# Patient Record
Sex: FEMALE | Race: WHITE | NOT HISPANIC OR LATINO | Employment: OTHER | URBAN - METROPOLITAN AREA
[De-identification: names, ages, dates, MRNs, and addresses within clinical notes are randomized per-mention and may not be internally consistent; named-entity substitution may affect disease eponyms.]

---

## 2018-02-17 ENCOUNTER — APPOINTMENT (EMERGENCY)
Dept: RADIOLOGY | Facility: HOSPITAL | Age: 73
End: 2018-02-17
Payer: COMMERCIAL

## 2018-02-17 ENCOUNTER — HOSPITAL ENCOUNTER (EMERGENCY)
Facility: HOSPITAL | Age: 73
Discharge: HOME | End: 2018-02-17
Attending: EMERGENCY MEDICINE
Payer: COMMERCIAL

## 2018-02-17 VITALS
BODY MASS INDEX: 23.99 KG/M2 | RESPIRATION RATE: 18 BRPM | OXYGEN SATURATION: 98 % | SYSTOLIC BLOOD PRESSURE: 153 MMHG | HEIGHT: 65 IN | DIASTOLIC BLOOD PRESSURE: 63 MMHG | HEART RATE: 86 BPM | TEMPERATURE: 97.9 F | WEIGHT: 144 LBS

## 2018-02-17 DIAGNOSIS — S42.309A HUMERUS FRACTURE: Primary | ICD-10-CM

## 2018-02-17 PROCEDURE — 99283 EMERGENCY DEPT VISIT LOW MDM: CPT

## 2018-02-17 PROCEDURE — 73030 X-RAY EXAM OF SHOULDER: CPT

## 2018-02-17 RX ORDER — IBUPROFEN 400 MG/1
TABLET ORAL EVERY 6 HOURS PRN
COMMUNITY
End: 2018-05-05 | Stop reason: ALTCHOICE

## 2018-02-17 RX ORDER — HYDROCODONE BITARTRATE AND ACETAMINOPHEN 5; 325 MG/1; MG/1
1 TABLET ORAL EVERY 6 HOURS PRN
Qty: 10 TABLET | Refills: 0 | Status: SHIPPED | OUTPATIENT
Start: 2018-02-17 | End: 2018-02-27

## 2018-02-17 RX ORDER — HYDROCODONE BITARTRATE AND ACETAMINOPHEN 5; 325 MG/1; MG/1
1 TABLET ORAL ONCE
Status: COMPLETED | OUTPATIENT
Start: 2018-02-17 | End: 2018-02-17

## 2018-02-17 RX ADMIN — HYDROCODONE BITARTRATE AND ACETAMINOPHEN 1 TABLET: 5; 325 TABLET ORAL at 17:56

## 2018-02-17 NOTE — ED PROVIDER NOTES
History  Chief Complaint   Patient presents with    Shoulder Injury     Patient presents to the ED for evaluation and treatment of an injury to her right shoulder that occured approximately 1 5 hours PTA when she was walking her dog  History provided by:  Patient   used: No      Patient is a 71-year-old female presenting to emergency department right shoulder pain  Was walking a new doctor dog when the dog took off and pulled the shoulder  Since then has been having pain in the shoulder  No head trauma  No neck pain  No chest pain, abdominal pain, shortness of breath  No back pain  No leg pain  No weakness numbness or tingling  MDM shoulder x-ray shows fracture of the neck  Patient does not want any narcotics  Ibuprofen and sling  Follow up with Orthopedics  Changed her mind  Will give Vicodin  Prior to Admission Medications   Prescriptions Last Dose Informant Patient Reported? Taking? ALPRAZolam (XANAX) 0 5 mg tablet   Yes No   Sig: Take 0 5 mg by mouth daily at bedtime as needed for anxiety  ibuprofen (MOTRIN) 400 mg tablet   Yes Yes   Sig: Take by mouth every 6 (six) hours as needed for mild pain      Facility-Administered Medications: None       Past Medical History:   Diagnosis Date    Anxiety     Non-Hodgkin lymphoma ([address] )        Past Surgical History:   Procedure Laterality Date    BREAST SURGERY      biopsy    HYSTERECTOMY      TONSILLECTOMY         Family History   Problem Relation Age of Onset    Ovarian cancer Mother     Heart attack Father      I have reviewed and agree with the history as documented  Social History   Substance Use Topics    Smoking status: Never Smoker    Smokeless tobacco: Never Used    Alcohol use Yes      Comment: socially        Review of Systems   Constitutional: Negative for chills, diaphoresis and fever  HENT: Negative for congestion and sore throat      Respiratory: Negative for cough, shortness of breath, wheezing and stridor  Cardiovascular: Negative for chest pain, palpitations and leg swelling  Gastrointestinal: Negative for abdominal pain, blood in stool, diarrhea, nausea and vomiting  Genitourinary: Negative for dysuria, frequency and urgency  Musculoskeletal: Negative for neck pain and neck stiffness  Skin: Negative for pallor and rash  Neurological: Negative for dizziness, syncope, weakness, light-headedness and headaches  All other systems reviewed and are negative  Physical Exam  ED Triage Vitals   Temperature Pulse Respirations Blood Pressure SpO2   02/17/18 1722 02/17/18 1720 02/17/18 1720 02/17/18 1720 02/17/18 1720   97 9 °F (36 6 °C) 86 18 153/63 98 %      Temp Source Heart Rate Source Patient Position - Orthostatic VS BP Location FiO2 (%)   02/17/18 1720 02/17/18 1720 02/17/18 1720 02/17/18 1720 --   Oral Monitor Sitting Right arm       Pain Score       02/17/18 1720       3           Orthostatic Vital Signs  Vitals:    02/17/18 1720   BP: 153/63   Pulse: 86   Patient Position - Orthostatic VS: Sitting       Physical Exam   Constitutional: She is oriented to person, place, and time  She appears well-developed and well-nourished  HENT:   Head: Normocephalic and atraumatic  Eyes: EOM are normal  Pupils are equal, round, and reactive to light  Neck: Normal range of motion  Neck supple  Cardiovascular: Normal rate, regular rhythm, normal heart sounds and intact distal pulses  Pulmonary/Chest: Effort normal and breath sounds normal  No respiratory distress  Abdominal: Soft  Bowel sounds are normal  There is no tenderness  Musculoskeletal: She exhibits no edema  Decreased range of motion of the right shoulder, this is due to pain, neurovascularly intact distally  All other joints intact  Neurological: She is alert and oriented to person, place, and time  Skin: Skin is warm and dry  Capillary refill takes less than 2 seconds  No rash noted  No erythema     Vitals reviewed  ED Medications  Medications   HYDROcodone-acetaminophen (NORCO) 5-325 mg per tablet 1 tablet (1 tablet Oral Given 2/17/18 1756)       Diagnostic Studies  Results Reviewed     None                 XR shoulder 2+ views RIGHT   ED Interpretation by Lai Reich MD (02/17 1719)   Humerus neck fracture                 Procedures  Procedures       Phone Contacts  ED Phone Contact    ED Course  ED Course                                MDM  CritCare Time    Disposition  Final diagnoses:   Humerus fracture     Time reflects when diagnosis was documented in both MDM as applicable and the Disposition within this note     Time User Action Codes Description Comment    2/17/2018  5:22 PM Manuela Garcia Add [S42 309A] Humerus fracture       ED Disposition     ED Disposition Condition Comment    Discharge  [address] [facility] discharge to home/self care  Condition at discharge: Good        Follow-up Information     Follow up With Specialties Details [facility] [address] Emergency Department Emergency Medicine  As needed, If symptoms worsen, headache, chest pain, shortness of breath, weakness, numbness, tingling or feeling worse overall [address] [address]  [phone] [facility] ED, [address], [place], [place], [address] [facility] In 5 days Humerus fracture [address] [facility] [address]  [phone]         Discharge Medication List as of 2/17/2018  5:25 PM      CONTINUE these medications which have NOT CHANGED    Details   ibuprofen (MOTRIN) 400 mg tablet Take by mouth every 6 (six) hours as needed for mild pain, Historical Med      ALPRAZolam (XANAX) 0 5 mg tablet Take 0 5 mg by mouth daily at bedtime as needed for anxiety  , Until Discontinued, Historical Med           No discharge procedures on file      ED Provider  Electronically Signed by           Boogie Akhtar MD  02/17/18 1358

## 2018-02-17 NOTE — DISCHARGE INSTRUCTIONS
Please use ibuprofen and Tylenol for pain  You  Arm Fracture in Adults   WHAT YOU NEED TO KNOW:   An arm fracture is a crack or break in one or more of the bones in your arm  An arm fracture may be caused by a fall onto an outstretched hand  It may also be caused by trauma from a car accident or a sports injury  Osteoporosis (brittle bones) can increase your risk for a fracture  DISCHARGE INSTRUCTIONS:   Seek care immediately if:   · The pain in your injured arm does not get better or gets worse, even after you rest and take medicine  · Your injured arm, hand, or fingers feel numb  · Your arm is swollen, red, and feels warm  · Your skin over the arm fracture is swollen, cold, or pale  · You cannot move your arm, hand, or fingers  Contact your healthcare provider if:   · You have a fever  · Your brace or splint becomes wet, damaged, comes off  · You have questions or concerns about your injury, treatment, or care  Medicines:   · NSAIDs , such as ibuprofen, help decrease swelling and pain  This medicine is available with or without a doctor's order  NSAIDs can cause stomach bleeding or kidney problems in certain people  If you take blood thinner medicine, always ask your healthcare provider if NSAIDs are safe for you  Always read the medicine label and follow directions  · Acetaminophen  decreases pain  It is available without a doctor's order  Ask how much to take and how often to take it  Follow directions  Acetaminophen can cause liver damage if not taken correctly  · Prescription pain medicine  may be given  Ask how to take this medicine safely  · Take your medicine as directed  Contact your healthcare provider if you think your medicine is not helping or if you have side effects  Tell him or her if you are allergic to any medicine  Keep a list of the medicines, vitamins, and herbs you take  Include the amounts, and when and why you take them   Bring the list or the pill bottles to follow-up visits  Carry your medicine list with you in case of an emergency  Follow up with your healthcare provider within 1 week: You may need to see a bone specialist within 3 to 4 days if you need surgery or further treatment for your arm fracture  Write down your questions so you remember to ask them during your visits  Rest:  You should rest your arm as much as possible  Ask your healthcare provider when you can put pressure or weight on your arm  Also ask when you can return to sports or vigorous exercises  Ice:  Apply ice on your arm for 15 to 20 minutes every hour or as directed  Use an ice pack, or put crushed ice in a plastic bag  Cover it with a towel  Ice helps prevent tissue damage and decreases swelling and pain  Elevate:  Elevate your arm above the level of your heart as often as you can  This will help decrease swelling and pain  Prop your arm on pillows or blankets to keep it elevated comfortably  Care for your cast or splint:  Ask your healthcare provider when it is okay to bathe  Do not get your cast or splint wet  Before you take a bath or shower, cover your cast or splint with a plastic bag  Tape the bag to your skin to help keep water out  Hold your arm away from the water in case the bag leaks  · Check the skin around your cast or splint each day for any redness or open skin  · Do not use a sharp or pointed object to scratch your skin under the cast or splint  Physical therapy:  A physical therapist teaches you exercises to help improve movement and strength, and to decrease pain  © 2017 [address] Information is for End User's use only and may not be sold, redistributed or otherwise used for commercial purposes  All illustrations and images included in CareNotes® are the copyrighted property of [employer]  or Torin Chakraborty  The above information is an  only   It is not intended as medical advice for individual conditions or treatments  Talk to your doctor, nurse or pharmacist before following any medical regimen to see if it is safe and effective for you  can take them every 6 hours

## 2018-02-20 ENCOUNTER — OFFICE VISIT (OUTPATIENT)
Dept: OBGYN CLINIC | Facility: CLINIC | Age: 73
End: 2018-02-20
Payer: COMMERCIAL

## 2018-02-20 VITALS
WEIGHT: 148.6 LBS | SYSTOLIC BLOOD PRESSURE: 160 MMHG | BODY MASS INDEX: 24.76 KG/M2 | HEIGHT: 65 IN | HEART RATE: 87 BPM | DIASTOLIC BLOOD PRESSURE: 78 MMHG

## 2018-02-20 DIAGNOSIS — S42.251A DISPLACED FRACTURE OF GREATER TUBEROSITY OF RIGHT HUMERUS, INITIAL ENCOUNTER FOR CLOSED FRACTURE: ICD-10-CM

## 2018-02-20 PROCEDURE — 99204 OFFICE O/P NEW MOD 45 MIN: CPT | Performed by: ORTHOPAEDIC SURGERY

## 2018-02-20 PROCEDURE — 23620 CLTX GR HMRL TBRS FX WO MNPJ: CPT | Performed by: ORTHOPAEDIC SURGERY

## 2018-02-20 NOTE — LETTER
To whom it May concern:    Topher Almanza is medically not cleared for flying due to her right shoulder fracture  Please cancel or reschedule her flight for next week as she will not be able to fly for quite some time  If you have any further questions, please do not hesitate to ask      Sincerely,    Tripp Zee MD

## 2018-02-20 NOTE — PROGRESS NOTES
H&P Exam - Orthopedics   Wilbur Lorenz 71 y o  female MRN: [id number]  Unit/Bed#:  Encounter: [id number]    Assessment/Plan     Assessment:  Right proximal humerus fracture, closed, initial encounter  Plan:  Trina Calle has an acceptably aligned right proximal humerus fracture  She will do well in her sling and I do not feel any other intervention is necessary at this time  I encouraged her to continue to ice for swelling and pain  I explained that the bruising is a result of her fracture and the bruising may worsen  She will follow up with me in 4 weeks to have repeat x-rays  Scribe Attestation    I,:   Anson Saldaña MA am acting as a scribe while in the presence of the attending physician :        I,:   Cheng Balderrama MD personally performed the services described in this documentation    as scribed in my presence :            History of Present Illness   HPI:  Dorota Niece is a 71 y o  female who presents with a right proximal humerus fracture suffered on February 17, 2018 while walking her dog  She states her dog was startled and took off on a run forcibly yanking right shoulder causing her to fall  She was evaluated in the emergency room for right shoulder pain and obtained x-rays this covering the fracture  She remains in her sling that was provided to her by the emergency department though she admits to removing this at night to sleep  [place] Her chief complaint is of the persistent moderate ache about the right shoulder that is nonradiating  Range of motion will increase her pain and she is better at rest in her sling  She is also been using ibuprofen for pain control as well as icing  Review of Systems   Constitutional: Negative  HENT: Negative  Eyes: Negative  Respiratory: Negative  Cardiovascular: Negative  Gastrointestinal: Negative  Endocrine: Negative  Genitourinary: Negative  Musculoskeletal:        As noted in HPI   Allergic/Immunologic: Negative  Neurological: Negative  Hematological: Negative  Psychiatric/Behavioral: The patient is nervous/anxious  Historical Information   Past Medical History:   Diagnosis Date    Anxiety     Non-Hodgkin lymphoma ([address] )      Past Surgical History:   Procedure Laterality Date    BREAST SURGERY      biopsy    HYSTERECTOMY      TONSILLECTOMY       Social History   History   Alcohol Use    Yes     Comment: socially     History   Drug Use No     History   Smoking Status    Never Smoker   Smokeless Tobacco    Never Used     Family History:   Family History   Problem Relation Age of Onset    Ovarian cancer Mother     Heart attack Father        Meds/Allergies   all medications and allergies reviewed  No Known Allergies    Objective   Vitals: Blood pressure 160/78, pulse 87, height 5' 5" (1 651 m), weight 67 4 kg (148 lb 9 6 oz)  ,Body mass index is 24 73 kg/m²  [unfilled]    [unfilled]    Invasive Devices          No matching active lines, drains, or airways          Physical Exam   Constitutional: She is oriented to person, place, and time  She appears well-developed and well-nourished  HENT:   Head: Normocephalic and atraumatic  Eyes: Conjunctivae and EOM are normal    Neck: Normal range of motion  Cardiovascular: Normal rate  Pulmonary/Chest: Effort normal and breath sounds normal    Musculoskeletal:   As noted in HPI   Neurological: She is alert and oriented to person, place, and time  Psychiatric: She has a normal mood and affect  Her behavior is normal  Judgment and thought content normal      Right Shoulder Exam     Tenderness   Right shoulder tenderness location: Proximal humerus  Other   Sensation: normal  Pulse: present    Comments:  Antalgia to ROM  Range of motion and strength deferred  Normal sensations distally and 2+ radial pulse  Left Shoulder Exam     Muscle Strength   Left shoulder normal muscle strength: Deferred              Lab Results:  None  Imaging: X-rays shoulder taken on February 17, 2018 demonstrate an acute proximal humerus fracture and evidence of calcific tendinitis  EKG, Pathology, and Other Studies: None    Code Status: [unfilled]  Advance Directive and Living Will:      Power of :    POLST:

## 2018-02-20 NOTE — PATIENT INSTRUCTIONS
Proximal Humerus Fracture   WHAT YOU NEED TO KNOW:   A proximal humerus fracture is a crack or break in the top of your upper arm bone  The proximal humerus is one of the bones in your shoulder joint  This type of fracture may be caused by a fall, trauma from a car accident, or a sports injury  DISCHARGE INSTRUCTIONS:   Return to the emergency department if:   · Your pain does not get better or gets worse, even after you rest and take medicine  · Your arm, hand, or fingers feel numb  · The skin over your fracture is swollen, cold, or pale  · You cannot move your arm, hand, or fingers  Contact your healthcare provider if:   · You have a fever  · Your sling gets wet, damaged, or falls off  · You have questions or concerns about your condition or care  Medicines:   · Prescription pain medicine  may be given  Ask your healthcare provider how to take this medicine safely  Some prescription pain medicines contain acetaminophen  Do not take other medicines that contain acetaminophen without talking to your healthcare provider  Too much acetaminophen may cause liver damage  Prescription pain medicine may cause constipation  Ask your healthcare provider how to prevent or treat constipation  · NSAIDs , such as ibuprofen, help decrease swelling, pain, and fever  This medicine is available with or without a doctor's order  NSAIDs can cause stomach bleeding or kidney problems in certain people  If you take blood thinner medicine, always ask your healthcare provider if NSAIDs are safe for you  Always read the medicine label and follow directions  · Acetaminophen  decreases pain  It is available without a doctor's order  Ask how much to take and how often to take it  Follow directions  Acetaminophen can cause liver damage if not taken correctly  · Take your medicine as directed  Contact your healthcare provider if you think your medicine is not helping or if you have side effects   Tell him of her if you are allergic to any medicine  Keep a list of the medicines, vitamins, and herbs you take  Include the amounts, and when and why you take them  Bring the list or the pill bottles to follow-up visits  Carry your medicine list with you in case of an emergency  A sling  may be needed to hold your broken bones in place  It will decrease your arm movement and allow the bones to heal    Manage your symptoms:   · Rest  your arm as much as possible  Ask your healthcare provider when you can move your arm  Also ask when you can return to sports or vigorous exercises  · Apply ice  on your arm for 15 to 20 minutes every hour or as directed  Use an ice pack, or put crushed ice in a plastic bag  Cover it with a towel  Ice helps prevent tissue damage and decreases swelling and pain  · Go to physical therapy as directed  A physical therapist teaches you exercises to help improve movement and strength, and to decrease pain  Follow up with your healthcare provider as directed:  Write down your questions so you remember to ask them during your visits  © 2017 [address] [facility] Information is for End User's use only and may not be sold, redistributed or otherwise used for commercial purposes  All illustrations and images included in CareNotes® are the copyrighted property of A D A M [employer] Inc  or Torin Chakraborty  The above information is an  only  It is not intended as medical advice for individual conditions or treatments  Talk to your doctor, nurse or pharmacist before following any medical regimen to see if it is safe and effective for you

## 2018-03-23 ENCOUNTER — OFFICE VISIT (OUTPATIENT)
Dept: OBGYN CLINIC | Facility: CLINIC | Age: 73
End: 2018-03-23

## 2018-03-23 ENCOUNTER — APPOINTMENT (OUTPATIENT)
Dept: RADIOLOGY | Facility: CLINIC | Age: 73
End: 2018-03-23
Payer: COMMERCIAL

## 2018-03-23 DIAGNOSIS — S42.251A DISPLACED FRACTURE OF GREATER TUBEROSITY OF RIGHT HUMERUS, INITIAL ENCOUNTER FOR CLOSED FRACTURE: ICD-10-CM

## 2018-03-23 DIAGNOSIS — S42.251A DISPLACED FRACTURE OF GREATER TUBEROSITY OF RIGHT HUMERUS, INITIAL ENCOUNTER FOR CLOSED FRACTURE: Primary | ICD-10-CM

## 2018-03-23 PROCEDURE — 99024 POSTOP FOLLOW-UP VISIT: CPT | Performed by: ORTHOPAEDIC SURGERY

## 2018-03-23 PROCEDURE — 73030 X-RAY EXAM OF SHOULDER: CPT

## 2018-03-23 NOTE — PROGRESS NOTES
Subjective: The real a is a 61-year-old female returned to see us status post 5 weeks of a right greater tuberosity fracture  She states she is doing well although she does have some concern over the continued pain that she is having in the lateral aspect of the right shoulder and will radiate distally  She is concerned that she may have been too active over the past 4 weeks  Her intermittent pain increases with range of motion and she is better at rest and in her comfort sling  Scribe Attestation    I,:   Anson Saldaña MA am acting as a scribe while in the presence of the attending physician :        I,:   Rajiv Oliva MD personally performed the services described in this documentation    as scribed in my presence :            Objective: Inspection of the right shoulder from the skin to be of normal color and temperature  There is mild tenderness over the lateral aspect of the shoulder near the humeral neck  She has good sensations distally normal sensation to light touch and 2+ radial pulse  Range of motion is limited secondary to pain and as expected  Imaging:  X-rays of the right shoulder demonstrate a healing displaced greater tuberosity fracture with acceptable alignment of the right shoulder  Assessment:  Closed healing and displaced greater tuberosity fracture, subsequent encounter    Plan:  Linseyjamil Carpenter is doing well  Her x-rays today demonstrate acceptable alignment in the fracture is healing  She can now discontinue to use the sling  I have also provided her a prescription for physical therapy  I informed her that she can now begin to use her arm for more daily activities gradually  She will follow up with me in 6 weeks for new x-rays and repeat evaluation

## 2018-04-09 ENCOUNTER — EVALUATION (OUTPATIENT)
Dept: PHYSICAL THERAPY | Facility: CLINIC | Age: 73
End: 2018-04-09
Payer: COMMERCIAL

## 2018-04-09 DIAGNOSIS — S42.251D CLOSED DISPLACED FRACTURE OF GREATER TUBEROSITY OF RIGHT HUMERUS WITH ROUTINE HEALING, SUBSEQUENT ENCOUNTER: Primary | ICD-10-CM

## 2018-04-09 DIAGNOSIS — S42.251A DISPLACED FRACTURE OF GREATER TUBEROSITY OF RIGHT HUMERUS, INITIAL ENCOUNTER FOR CLOSED FRACTURE: ICD-10-CM

## 2018-04-09 PROCEDURE — 97110 THERAPEUTIC EXERCISES: CPT | Performed by: PHYSICAL THERAPIST

## 2018-04-09 PROCEDURE — 97112 NEUROMUSCULAR REEDUCATION: CPT | Performed by: PHYSICAL THERAPIST

## 2018-04-09 PROCEDURE — G8985 CARRY GOAL STATUS: HCPCS | Performed by: PHYSICAL THERAPIST

## 2018-04-09 PROCEDURE — 97161 PT EVAL LOW COMPLEX 20 MIN: CPT | Performed by: PHYSICAL THERAPIST

## 2018-04-09 PROCEDURE — G8984 CARRY CURRENT STATUS: HCPCS | Performed by: PHYSICAL THERAPIST

## 2018-04-09 NOTE — PROGRESS NOTES
PT Evaluation     Today's date: 2018  Patient name: Alfredo Robles  : 1945  MRN: [id number]  Referring provider: Rin Sage MD  Dx:   Encounter Diagnosis     ICD-10-CM    1  Closed displaced fracture of greater tuberosity of right humerus, initial encounter S42 942A                   Assessment  Impairments: abnormal or restricted ROM, impaired physical strength and pain with function  Functional limitations: Difficulty with right UE above shoulder height  Assessment details: Patient is a 71 y o  Female s/p fall and subsequent fracture of right greater tuberosity  She presents to the clinic with reports of mild pain, weakness and significant limitations in right shoulder ROM which effects functional use of her Right UE overhead  She will benefit from skilled PT services in order to decrease her pain, and increase her shoulder strength and ROM to facilitate return to participation in yoga and her previous level of function  Understanding of Dx/Px/POC: good  Goals  STG (3 weeks):  1  Patient will demonstrate right shoulder flexion and abd to 130 degrees to facility increased use of right UE overhead  2  Patient will deny pain with overhead use of right UE  LTG (6 weeks):  1  Patient will demonstrate right shoulder ROM to [facility] in order to dress normally  2  Patient will demonstrate right shoulder strength of 4+/5 in order to lift stack of plates overhead  3   Patient will report right shoulder pain as a 0-1/10 at worst in order to resume previous level of activity and yoga    Plan  Patient would benefit from: skilled PT  Planned modality interventions: cryotherapy  Planned therapy interventions: manual therapy, joint mobilization, patient education, neuromuscular re-education, strengthening, stretching, therapeutic activities, therapeutic exercise and home exercise program  Frequency: 2x week  Duration in weeks: 6  Treatment plan discussed with: patient        Subjective Evaluation    History of Present Illness  Date of onset: 2018  Mechanism of injury: Patient was walking her dog and it pulled her off of her foot  Went to the ED at [address] [place] [address] and had an x-ray which was positive for a greater tuberosity fracture of the right Ue  Saw orthopedist a few days later  She was in a sling for 5 weeks  Had xrays recently and was cleared to start physical therapy  Not a recurrent problem   Quality of life: excellent    Pain  Current pain ratin  At best pain ratin  At worst pain rating: 3  Location: right proximal lateral brashium  Quality: tight  Relieving factors: rest  Progression: improved    Social Support  Lives with: spouse    Employment status: not working (Retired)  Hand dominance: right  Exercise history: Yoga 2x/wk  Has not resumed  Diagnostic Tests  X-ray: abnormal (Right GH fx of UE)    FCE comments: Has resumed all normal activities  Difficulty styling hair  Was able to put bra on behind her back today  Treatments  Previous treatment: medication  Current treatment: physical therapy  Patient Goals  Patient goals for therapy: increased motion and increased strength          Objective     Active Range of Motion   Left Shoulder   Flexion: 153 degrees   Abduction: 150 degrees   External rotation 45°: with pain  Internal rotation 45°: with pain    Right Shoulder   Flexion: 110 degrees with pain  Abduction: 98 degrees with pain  External rotation 45°: 53 degrees with pain  Internal rotation 45°: 55 degrees with pain    Passive Range of Motion     Right Shoulder   Flexion: 120 degrees with pain  Abduction: 102 degrees with pain  External rotation 45°: 53 degrees with pain  Internal rotation 45°: 55 degrees with pain    Strength/Myotome Testing     Right Shoulder     Planes of Motion   Flexion: 4   Abduction: 4   External rotation at 0°: 4   Internal rotation at 0°: 4+           Precautions: Non-Hodgkins Lymphoma, Anxiety    Daily Treatment Diary     Manual              PROM right shoulder [address] [facility] mobs                                                        Exercise Diary  4/9            UBE             Pulleys Flexion and abduction x20            Finger ladder flexion and abd             Cane AAROM x20            Towel IR stretch             Webslide Rows H,M,L             Shoulder 3 ways             T-band shoulder IR/ER             Bent over rows             Supine shoulder flexion             SL shoulder abd             SL shoulder ER             Seated stool lat stretch                                                                                                            Modalities              CP prn right shld

## 2018-04-12 ENCOUNTER — OFFICE VISIT (OUTPATIENT)
Dept: PHYSICAL THERAPY | Facility: CLINIC | Age: 73
End: 2018-04-12
Payer: COMMERCIAL

## 2018-04-12 DIAGNOSIS — S42.251D CLOSED DISPLACED FRACTURE OF GREATER TUBEROSITY OF RIGHT HUMERUS WITH ROUTINE HEALING, SUBSEQUENT ENCOUNTER: Primary | ICD-10-CM

## 2018-04-12 PROCEDURE — 97140 MANUAL THERAPY 1/> REGIONS: CPT

## 2018-04-12 PROCEDURE — 97112 NEUROMUSCULAR REEDUCATION: CPT

## 2018-04-12 PROCEDURE — 97110 THERAPEUTIC EXERCISES: CPT

## 2018-04-12 NOTE — PROGRESS NOTES
Daily Note     Today's date: 2018  Patient name: Marietta Bell  : 1945  MRN: [id number]  Referring provider: Caitlin Albert MD  Dx:   Encounter Diagnosis     ICD-10-CM    1  Closed displaced fracture of greater tuberosity of right humerus with routine healing, subsequent encounter S42 251D        Start Time: 1803          Subjective: Pt denies any pain upon arrival today  She states that she has no pain she just cannot move her RUE the way she wants to      Objective: See treatment diary below  Precautions: Non-Hodgkins Lymphoma, Anxiety     Daily Treatment Diary      Manual                        PROM right shoulder  SA                     GH jt mobs                                                                                                     Exercise Diary                     UBE    5'F/5'B standing                   Pulleys Flexion and abduction x20  20x                   Finger ladder flexion and abd    20x                   Cane AAROM x20  20x                   Towel IR stretch                       Webslide Rows H,M,L                       Shoulder 3 ways    AROM 20X                   T-band shoulder IR/ER                       Bent over rows    AROM 20x                   Supine shoulder flexion    20x                   SL shoulder abd    20x                   SL shoulder ER    20x                   Seated stool lat stretch                                                                                                                                                                                                     Modalities                        CP prn right shld                                                                              Assessment: Pt had no pain post treatment today  She noted feeling great post PROM  She was educated on HEP including scapular retraction to decrease pain  Pt will benefit from continued skilled PT to improve ROM     Plan: Progress treatment as tolerated

## 2018-04-16 ENCOUNTER — OFFICE VISIT (OUTPATIENT)
Dept: PHYSICAL THERAPY | Facility: CLINIC | Age: 73
End: 2018-04-16
Payer: COMMERCIAL

## 2018-04-16 DIAGNOSIS — S42.251D CLOSED DISPLACED FRACTURE OF GREATER TUBEROSITY OF RIGHT HUMERUS WITH ROUTINE HEALING, SUBSEQUENT ENCOUNTER: Primary | ICD-10-CM

## 2018-04-16 PROCEDURE — 97110 THERAPEUTIC EXERCISES: CPT

## 2018-04-16 PROCEDURE — G8984 CARRY CURRENT STATUS: HCPCS

## 2018-04-16 PROCEDURE — G8985 CARRY GOAL STATUS: HCPCS

## 2018-04-16 PROCEDURE — 97112 NEUROMUSCULAR REEDUCATION: CPT

## 2018-04-16 PROCEDURE — 97530 THERAPEUTIC ACTIVITIES: CPT

## 2018-04-16 PROCEDURE — 97140 MANUAL THERAPY 1/> REGIONS: CPT

## 2018-04-16 NOTE — PROGRESS NOTES
Daily Note     Today's date: 2018  Patient name: Marietta Bell  : 1945  MRN: [id number]  Referring provider: Caitlin Albert MD  Dx:   Encounter Diagnosis     ICD-10-CM    1  Closed displaced fracture of greater tuberosity of right humerus with routine healing, subsequent encounter S42 251D        Start Time: 1608          Subjective: Pt denies any pain upon arrival today  She states that she has an ache in her shoulder but it is because of the weather  Objective: See treatment diary below  Precautions: Non-Hodgkins Lymphoma, Anxiety     Daily Treatment Diary      Manual                      PROM right shoulder  SA  SA                   GH jt mobs                                                                                                     Exercise Diary                   UBE    5'F/5'B standing  5'F/5'B standing                 Pulleys Flexion and abduction x20  20x  20x                 Finger ladder flexion and abd    20x  20x                 Cane AAROM x20  20x  20x                 Towel IR stretch                       Webslide Rows H,M,L      RTB 20x                 Shoulder 3 ways    AROM 20X  AROM 20x                 T-band shoulder IR/ER                       Bent over rows    AROM 20x  AROM 20x                 Supine shoulder flexion    20x  20x                 SL shoulder abd    20x  20x                 SL shoulder ER    20x  20x                 Seated stool lat stretch                                                                                                                                                                                                     Modalities                        CP prn right shld                                                                                 Assessment: Pt had no pain post treatment  She noted always feeling much better after performing Hanny  PROM is improving with verbal cues to relax   She was educated on HEP including correcting postural alignment to improve ROM  Pt will benefit from continued skilled PT to improve postural alignment and shoulder ROM  Plan: Progress treatment as tolerated

## 2018-04-19 ENCOUNTER — OFFICE VISIT (OUTPATIENT)
Dept: PHYSICAL THERAPY | Facility: CLINIC | Age: 73
End: 2018-04-19
Payer: COMMERCIAL

## 2018-04-19 DIAGNOSIS — S42.251D CLOSED DISPLACED FRACTURE OF GREATER TUBEROSITY OF RIGHT HUMERUS WITH ROUTINE HEALING, SUBSEQUENT ENCOUNTER: Primary | ICD-10-CM

## 2018-04-19 PROCEDURE — 97530 THERAPEUTIC ACTIVITIES: CPT

## 2018-04-19 PROCEDURE — 97140 MANUAL THERAPY 1/> REGIONS: CPT

## 2018-04-19 PROCEDURE — 97112 NEUROMUSCULAR REEDUCATION: CPT

## 2018-04-19 PROCEDURE — 97110 THERAPEUTIC EXERCISES: CPT

## 2018-04-19 NOTE — PROGRESS NOTES
Daily Note     Today's date: 2018  Patient name: Radha Jain  : 1945  MRN: [id number]  Referring provider: Malik Walsh MD  Dx:   Encounter Diagnosis     ICD-10-CM    1  Closed displaced fracture of greater tuberosity of right humerus with routine healing, subsequent encounter S42 251D        Start Time: 1735          Subjective: Pt c/o 4/10 pain in R shoulder today  She states that her shoulder has been aching since last night  Objective: See treatment diary below  Precautions: Non-Hodgkins Lymphoma, Anxiety     Daily Treatment Diary      Manual                    PROM right shoulder  SA  SA  SA                 GH jt mobs                                                                                                     Exercise Diary                 UBE    5'F/5'B standing  5'F/5'B standing  5'f/5'b standing               Pulleys Flexion and abduction x20  20x  20x  20x               Finger ladder flexion and abd    20x  20x  20x               Cane AAROM x20  20x  20x  20x               Towel IR stretch                       Webslide Rows H,M,L      RTB 20x  RTB 20x               Shoulder 3 ways    AROM 20X  AROM 20x  AROM 20x               T-band shoulder IR/ER        red 20x               Bent over rows    AROM 20x  AROM 20x  AROM 20x               Supine shoulder flexion    20x  20x  20x               SL shoulder abd    20x  20x  20x               SL shoulder ER    20x  20x  20x               Seated stool lat stretch                                                                                                                                                                                                     Modalities                        CP prn right shld                                                                            Assessment: Pt had no pain post manual PROM today  She noted feeling less stiff while performing Hanny today   She was advised to use ice @ home if she has pain this weekend  Pt will benefit from continued skilled PT to decrease pain and improve ROM  Plan: Progress treatment as tolerated

## 2018-04-23 ENCOUNTER — OFFICE VISIT (OUTPATIENT)
Dept: PHYSICAL THERAPY | Facility: CLINIC | Age: 73
End: 2018-04-23
Payer: COMMERCIAL

## 2018-04-23 DIAGNOSIS — S42.251D CLOSED DISPLACED FRACTURE OF GREATER TUBEROSITY OF RIGHT HUMERUS WITH ROUTINE HEALING, SUBSEQUENT ENCOUNTER: Primary | ICD-10-CM

## 2018-04-23 PROCEDURE — 97140 MANUAL THERAPY 1/> REGIONS: CPT

## 2018-04-23 PROCEDURE — 97530 THERAPEUTIC ACTIVITIES: CPT

## 2018-04-23 PROCEDURE — 97112 NEUROMUSCULAR REEDUCATION: CPT

## 2018-04-23 PROCEDURE — 97110 THERAPEUTIC EXERCISES: CPT

## 2018-04-23 NOTE — PROGRESS NOTES
Daily Note     Today's date: 2018  Patient name: Samreen Benton  : 1945  MRN: [id number]  Referring provider: Jose De Jesus Goodman MD  Dx:   Encounter Diagnosis     ICD-10-CM    1  Closed displaced fracture of greater tuberosity of right humerus with routine healing, subsequent encounter S45 936G                   Subjective: Pt denies any pain upon arrival today  She states that she has been feeling really good lately  Objective: See treatment diary below  Precautions: Non-Hodgkins Lymphoma, Anxiety     Daily Treatment Diary      Manual                  PROM right shoulder  SA  SA  SA  SA               GH jt mobs                                                                                                     Exercise Diary               UBE    5'F/5'B standing  5'F/5'B standing  5'f/5'b standing  5'F/5'B standing             Pulleys Flexion and abduction x20  20x  20x  20x  20x             Finger ladder flexion and abd    20x  20x  20x  20x             Cane AAROM x20  20x  20x  20x  20x             Towel IR stretch                       Webslide Rows H,M,L      RTB 20x  RTB 20x  GTB 20x             Shoulder 3 ways    AROM 20X  AROM 20x  AROM 20x  AROM 20x             T-band shoulder IR/ER        red 20x  GTB 20x             Bent over rows    AROM 20x  AROM 20x  AROM 20x  AROM 20x             Supine shoulder flexion    20x  20x  20x  20x             SL shoulder abd    20x  20x  20x  20x             SL shoulder ER    20x  20x  20x  20x             Seated stool lat stretch                                                                                                                                                                                                     Modalities                        CP prn right shld                                                                              Assessment: pt had no pain post manual treatment today  She noted feeling as if her ROM is improving and she is feeling more like herself  She was educated on HEP including scapular retraction and postural alignment to decrease pain outside of therapy  Pt will benefit from continued skilled PT to improve ROM  Plan: Progress treatment as tolerated

## 2018-04-25 ENCOUNTER — OFFICE VISIT (OUTPATIENT)
Dept: PHYSICAL THERAPY | Facility: CLINIC | Age: 73
End: 2018-04-25
Payer: COMMERCIAL

## 2018-04-25 DIAGNOSIS — S42.251A DISPLACED FRACTURE OF GREATER TUBEROSITY OF RIGHT HUMERUS, INITIAL ENCOUNTER FOR CLOSED FRACTURE: ICD-10-CM

## 2018-04-25 DIAGNOSIS — S42.251D CLOSED DISPLACED FRACTURE OF GREATER TUBEROSITY OF RIGHT HUMERUS WITH ROUTINE HEALING, SUBSEQUENT ENCOUNTER: Primary | ICD-10-CM

## 2018-04-25 PROCEDURE — 97140 MANUAL THERAPY 1/> REGIONS: CPT | Performed by: PHYSICAL THERAPIST

## 2018-04-25 PROCEDURE — 97110 THERAPEUTIC EXERCISES: CPT | Performed by: PHYSICAL THERAPIST

## 2018-04-25 PROCEDURE — 97530 THERAPEUTIC ACTIVITIES: CPT | Performed by: PHYSICAL THERAPIST

## 2018-04-25 PROCEDURE — 97112 NEUROMUSCULAR REEDUCATION: CPT | Performed by: PHYSICAL THERAPIST

## 2018-04-25 NOTE — PROGRESS NOTES
Daily Note     Today's date: 2018  Patient name: Brittani Morgan  : 1945  MRN: [id number]  Referring provider: Dong Soares MD  Dx:   Encounter Diagnosis     ICD-10-CM    1  Closed displaced fracture of greater tuberosity of right humerus with routine healing, subsequent encounter S42 251D    2  Displaced fracture of greater tuberosity of right humerus, initial encounter for closed fracture S42 251A        Start Time: 1026          Subjective: Feeling much better overall  No reports of pain  Able to raise arm overhead better    No pain      Objective: See treatment diary belowPrecautions: Non-Hodgkins Lymphoma, Anxiety     Daily Treatment Diary      Manual                PROM right shoulder  SA  SA  SA  SA  JF             GH jt mobs                                                                                                     Exercise Diary             UBE    5'F/5'B standing  5'F/5'B standing  5'f/5'b standing  5'F/5'B standing  x10'           Pulleys Flexion and abduction x20  20x  20x  20x  20x  DC           Finger ladder flexion and abd    20x  20x  20x  20x  DC           Cane AAROM x20  20x  20x  20x  20x  20x           Towel IR stretch                       Webslide Rows H,M,L      RTB 20x  RTB 20x  GTB 20x  BTB 20x           Shoulder 3 ways    AROM 20X  AROM 20x  AROM 20x  AROM 20x  AROM 20x           T-band shoulder IR/ER        red 20x  GTB 20x  BTB 20x           Bent over rows    AROM 20x  AROM 20x  AROM 20x  AROM 20x  AROM 20x 1#           Supine shoulder flexion    20x  20x  20x  20x  1#           SL shoulder abd    20x  20x  20x  20x  1#           SL shoulder ER    20x  20x  20x  20x  1#           Seated stool lat stretch                                                                                                                                                                                                   Modalities                        CP prn right shld                                                                               Assessment: Pt had no pain post treatment  She noted feeling much better and feeling as if she is improving greatly both in ROM and strength  She was advised to use soup cans @ home to increase intensity with HEP  Pt will benefit from continued skilled PT to improve strength  Plan: Progress treatment as tolerated

## 2018-05-01 ENCOUNTER — EVALUATION (OUTPATIENT)
Dept: PHYSICAL THERAPY | Facility: CLINIC | Age: 73
End: 2018-05-01
Payer: COMMERCIAL

## 2018-05-01 DIAGNOSIS — S42.251D CLOSED DISPLACED FRACTURE OF GREATER TUBEROSITY OF RIGHT HUMERUS WITH ROUTINE HEALING, SUBSEQUENT ENCOUNTER: Primary | ICD-10-CM

## 2018-05-01 PROCEDURE — 97530 THERAPEUTIC ACTIVITIES: CPT

## 2018-05-01 PROCEDURE — G8986 CARRY D/C STATUS: HCPCS

## 2018-05-01 PROCEDURE — 97110 THERAPEUTIC EXERCISES: CPT

## 2018-05-01 PROCEDURE — G8985 CARRY GOAL STATUS: HCPCS

## 2018-05-01 PROCEDURE — 97140 MANUAL THERAPY 1/> REGIONS: CPT

## 2018-05-01 NOTE — PROGRESS NOTES
PT Discharge    Today's date: 2018  Patient name: Samreen Benton  : 1945  MRN: [id number]  Referring provider: Jose De Jesus Goodman MD  Dx:   Encounter Diagnosis     ICD-10-CM    1  Closed displaced fracture of greater tuberosity of right humerus with routine healing, subsequent encounter S42 251D        Start Time: 2876  Stop Time: 1050  Total time in clinic (min): 35 minutes    Assessment  Impairments: abnormal or restricted ROM, impaired physical strength and pain with function  Functional limitations: Difficulty with right UE above shoulder height  Assessment details: Patient demonstrates excellent gains in right shoulder ROM and strength since starting physical therapy  AROM is WFL and she denies pain  She is able to use her right UE for all household tasks and reaches overhead and behind her without complaints of pain  She is independent with a HEP and performs them daily  She feels she is ready for DC to HEP    Goals  STG (3 weeks):  1  Patient will demonstrate right shoulder flexion and abd to 130 degrees to facility increased use of right UE overhead - met  2  Patient will deny pain with overhead use of right UE - met  LTG (6 weeks):  1  Patient will demonstrate right shoulder ROM to [facility] in order to dress normally - met  2  Patient will demonstrate right shoulder strength of 4+/5 in order to lift stack of plates overhead - met  3  Patient will report right shoulder pain as a 0-1/10 at worst in order to resume previous level of activity and yoga - met    Plan  Planned modality interventions: cryotherapy  Planned therapy interventions: patient education  Treatment plan discussed with: patient        Subjective Evaluation    History of Present Illness  Date of onset: 2018  Mechanism of injury: Patient feels greatly improved since starting PT services  She states she performs her HEP daily and no longer has pain    She has resumed her previous level of function and requests DC of PT services at this time  Not a recurrent problem   Quality of life: excellent    Pain  Current pain ratin  At best pain ratin  At worst pain ratin  Location: right proximal lateral brashium  Quality: tight  Relieving factors: rest  Progression: resolved    Social Support  Lives with: spouse    Employment status: not working (Retired)  Hand dominance: right  Exercise history: Yoga 2x/wk  Has not resumed        Diagnostic Tests  X-ray: abnormal (Right GH fx of UE)  Treatments  Previous treatment: medication  Current treatment: physical therapy  Patient Goals  Patient goals for therapy: increased motion and increased strength          Objective     Active Range of Motion   Left Shoulder   Flexion: 153 degrees   Abduction: 150 degrees     Right Shoulder   Flexion: 142 degrees   Abduction: 138 degrees   External rotation 45°: 80 degrees   Internal rotation 45°: 75 degrees     Passive Range of Motion     Right Shoulder   Flexion: 155 degrees with pain  Abduction: 155 degrees with pain  External rotation 45°: 90 degrees with pain  Internal rotation 45°: 85 degrees with pain    Strength/Myotome Testing     Right Shoulder     Planes of Motion   Flexion: 4+   Abduction: 4+   External rotation at 0°: 4+   Internal rotation at 0°: 4+       Flowsheet Rows      Most Recent Value   PT/OT G-Codes   Current Score  94   Projected Score  69   FOTO information reviewed  Yes   Assessment Type  Discharge   G code set  Carrying, Moving & Handling Objects   Carrying, Moving and Handling Objects Goal Status ()  CJ   Carrying, Moving and Handling Objects Discharge Status ()  CI          Precautions: Non-Hodgkins Lymphoma, Anxiety    Daily Treatment Diary     Manual              PROM right shoulder             GH jt mobs                                                        Exercise Diary              UBE             Pulleys Flexion and abduction x20            Finger ladder flexion and abd             Cane AAROM x20 Towel IR stretch             Webslide Rows H,M,L             Shoulder 3 ways             T-band shoulder IR/ER             Bent over rows             Supine shoulder flexion             SL shoulder abd             SL shoulder ER             Seated stool lat stretch                                                                                                            Modalities              CP prn right shld

## 2018-05-01 NOTE — PROGRESS NOTES
Daily Note     Today's date: 2018  Patient name: Papo Hicks  : 1945  MRN: [id number]  Referring provider: Mariaelena Gonzalez MD  Dx:   Encounter Diagnosis     ICD-10-CM    1  Closed displaced fracture of greater tuberosity of right humerus with routine healing, subsequent encounter S41 857B                   Subjective: Pt denies any pain upon arrival today  She states that she has returned to her daily activities and is ready to DC today         Objective: See treatment diary below  Precautions: Non-Hodgkins Lymphoma, Anxiety     Daily Treatment Diary      Manual                PROM right shoulder  SA  SA  SA  SA  SA             GH jt mobs                                                                                                     Exercise Diary             UBE    5'F/5'B standing  5'F/5'B standing  5'f/5'b standing  5'F/5'B standing  5'F/5'B standing           Pulleys Flexion and abduction x20  20x  20x  20x  20x  NP           Finger ladder flexion and abd    20x  20x  20x  20x  NP           Cane AAROM x20  20x  20x  20x  20x  20x 2#            Towel IR stretch                       Webslide Rows H,M,L      RTB 20x  RTB 20x  GTB 20x  BTB 20x           Shoulder 3 ways    AROM 20X  AROM 20x  AROM 20x  AROM 20x  2# 15x           T-band shoulder IR/ER        red 20x  GTB 20x  BTB 20x           Bent over rows    AROM 20x  AROM 20x  AROM 20x  AROM 20x  2# 20x           Supine shoulder flexion    20x  20x  20x  20x  2# 20x           SL shoulder abd    20x  20x  20x  20x  2# 20x           SL shoulder ER    20x  20x  20x  20x  2# 20x           Seated stool lat stretch                                                                                                                                                                                                     Modalities                        CP prn right shld                                                                             Assessment: Pt had no pain post treatment today  She was DC to [facility] as she will be participating in the fitness program    Plan: Potential discharge next visit

## 2018-05-03 ENCOUNTER — APPOINTMENT (OUTPATIENT)
Dept: PHYSICAL THERAPY | Facility: CLINIC | Age: 73
End: 2018-05-03
Payer: COMMERCIAL

## 2018-05-04 ENCOUNTER — OFFICE VISIT (OUTPATIENT)
Dept: OBGYN CLINIC | Facility: CLINIC | Age: 73
End: 2018-05-04

## 2018-05-04 ENCOUNTER — APPOINTMENT (OUTPATIENT)
Dept: RADIOLOGY | Facility: CLINIC | Age: 73
End: 2018-05-04
Payer: COMMERCIAL

## 2018-05-04 VITALS — BODY MASS INDEX: 24.53 KG/M2 | HEIGHT: 65 IN | WEIGHT: 147.2 LBS

## 2018-05-04 DIAGNOSIS — S42.91XD CLOSED FRACTURE OF RIGHT SHOULDER WITH ROUTINE HEALING, SUBSEQUENT ENCOUNTER: Primary | ICD-10-CM

## 2018-05-04 DIAGNOSIS — S42.91XD CLOSED FRACTURE OF RIGHT SHOULDER WITH ROUTINE HEALING, SUBSEQUENT ENCOUNTER: ICD-10-CM

## 2018-05-04 DIAGNOSIS — S42.251A DISPLACED FRACTURE OF GREATER TUBEROSITY OF RIGHT HUMERUS, INITIAL ENCOUNTER FOR CLOSED FRACTURE: ICD-10-CM

## 2018-05-04 PROCEDURE — 73030 X-RAY EXAM OF SHOULDER: CPT

## 2018-05-04 PROCEDURE — 99024 POSTOP FOLLOW-UP VISIT: CPT | Performed by: ORTHOPAEDIC SURGERY

## 2018-05-04 NOTE — PROGRESS NOTES
Levy Marquis returns to see me today now just shy of 3 months out from her right proximal humerus fracture  She says she is doing well and has been discharged from physical therapy  Physical examination:  Right shoulder range of motion is excellent achieving forward flexion and abduction each to 160 degrees  Radiographic examination:  Right shoulder x-rays demonstrate excellent healing and alignment of the proximal humerus fracture  Assessment right proximal humerus fracture    Plan:  She has done very well and can be activities as tolerated this point  She will follow up as needed

## 2018-05-05 ENCOUNTER — APPOINTMENT (EMERGENCY)
Dept: CT IMAGING | Facility: HOSPITAL | Age: 73
End: 2018-05-05
Payer: COMMERCIAL

## 2018-05-05 ENCOUNTER — HOSPITAL ENCOUNTER (EMERGENCY)
Facility: HOSPITAL | Age: 73
End: 2018-05-05
Attending: EMERGENCY MEDICINE
Payer: COMMERCIAL

## 2018-05-05 ENCOUNTER — HOSPITAL ENCOUNTER (OUTPATIENT)
Facility: HOSPITAL | Age: 73
Setting detail: OBSERVATION
Discharge: HOME | End: 2018-05-06
Attending: SURGERY | Admitting: SURGERY
Payer: COMMERCIAL

## 2018-05-05 VITALS
RESPIRATION RATE: 20 BRPM | HEIGHT: 65 IN | DIASTOLIC BLOOD PRESSURE: 81 MMHG | WEIGHT: 147.27 LBS | BODY MASS INDEX: 24.54 KG/M2 | HEART RATE: 99 BPM | OXYGEN SATURATION: 97 % | SYSTOLIC BLOOD PRESSURE: 171 MMHG

## 2018-05-05 DIAGNOSIS — S12.191A OTHER CLOSED NONDISPLACED FRACTURE OF SECOND CERVICAL VERTEBRA, INITIAL ENCOUNTER (HCC): Primary | ICD-10-CM

## 2018-05-05 DIAGNOSIS — M54.2 NECK PAIN: ICD-10-CM

## 2018-05-05 DIAGNOSIS — S12.100A C2 CERVICAL FRACTURE (HCC): Primary | ICD-10-CM
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
## 2018-05-05 PROBLEM — S12.101A CLOSED NONDISPLACED FRACTURE OF SECOND CERVICAL VERTEBRA (HCC): Status: ACTIVE | Noted: 2018-05-05

## 2018-05-05 LAB
ANION GAP SERPL CALCULATED.3IONS-SCNC: 9 MMOL/L (ref 4–13)
BASOPHILS # BLD AUTO: 0.03 THOUSANDS/ΜL (ref 0–0.1)
BASOPHILS NFR BLD AUTO: 1 % (ref 0–1)
BUN SERPL-MCNC: 17 MG/DL (ref 5–25)
CALCIUM SERPL-MCNC: 9 MG/DL (ref 8.3–10.1)
CHLORIDE SERPL-SCNC: 105 MMOL/L (ref 100–108)
CO2 SERPL-SCNC: 26 MMOL/L (ref 21–32)
CREAT SERPL-MCNC: 0.87 MG/DL (ref 0.6–1.3)
EOSINOPHIL # BLD AUTO: 0.11 THOUSAND/ΜL (ref 0–0.61)
EOSINOPHIL NFR BLD AUTO: 2 % (ref 0–6)
ERYTHROCYTE [DISTWIDTH] IN BLOOD BY AUTOMATED COUNT: 13.4 % (ref 11.6–15.1)
GFR SERPL CREATININE-BSD FRML MDRD: 67 ML/MIN/1.73SQ M
GLUCOSE SERPL-MCNC: 111 MG/DL (ref 65–140)
HCT VFR BLD AUTO: 38.8 % (ref 34.8–46.1)
HGB BLD-MCNC: 13 G/DL (ref 11.5–15.4)
LYMPHOCYTES # BLD AUTO: 0.93 THOUSANDS/ΜL (ref 0.6–4.47)
LYMPHOCYTES NFR BLD AUTO: 19 % (ref 14–44)
MCH RBC QN AUTO: 29 PG (ref 26.8–34.3)
MCHC RBC AUTO-ENTMCNC: 33.5 G/DL (ref 31.4–37.4)
MCV RBC AUTO: 86 FL (ref 82–98)
MONOCYTES # BLD AUTO: 0.44 THOUSAND/ΜL (ref 0.17–1.22)
MONOCYTES NFR BLD AUTO: 9 % (ref 4–12)
NEUTROPHILS # BLD AUTO: 3.19 THOUSANDS/ΜL (ref 1.85–7.62)
NEUTS SEG NFR BLD AUTO: 67 % (ref 43–75)
NRBC BLD AUTO-RTO: 0 /100 WBCS
PLATELET # BLD AUTO: 243 THOUSANDS/UL (ref 149–390)
PMV BLD AUTO: 9 FL (ref 8.9–12.7)
POTASSIUM SERPL-SCNC: 4 MMOL/L (ref 3.5–5.3)
RBC # BLD AUTO: 4.49 MILLION/UL (ref 3.81–5.12)
SODIUM SERPL-SCNC: 140 MMOL/L (ref 136–145)
WBC # BLD AUTO: 4.79 THOUSAND/UL (ref 4.31–10.16)

## 2018-05-05 PROCEDURE — 72125 CT NECK SPINE W/O DYE: CPT

## 2018-05-05 PROCEDURE — 70450 CT HEAD/BRAIN W/O DYE: CPT

## 2018-05-05 PROCEDURE — 80048 BASIC METABOLIC PNL TOTAL CA: CPT | Performed by: EMERGENCY MEDICINE

## 2018-05-05 PROCEDURE — 36415 COLL VENOUS BLD VENIPUNCTURE: CPT | Performed by: EMERGENCY MEDICINE

## 2018-05-05 PROCEDURE — 99219 PR INITIAL OBSERVATION CARE/DAY 50 MINUTES: CPT | Performed by: SURGERY

## 2018-05-05 PROCEDURE — 99285 EMERGENCY DEPT VISIT HI MDM: CPT

## 2018-05-05 PROCEDURE — 74177 CT ABD & PELVIS W/CONTRAST: CPT

## 2018-05-05 PROCEDURE — 96375 TX/PRO/DX INJ NEW DRUG ADDON: CPT

## 2018-05-05 PROCEDURE — 85025 COMPLETE CBC W/AUTO DIFF WBC: CPT | Performed by: EMERGENCY MEDICINE

## 2018-05-05 PROCEDURE — 71260 CT THORAX DX C+: CPT

## 2018-05-05 PROCEDURE — 96374 THER/PROPH/DIAG INJ IV PUSH: CPT

## 2018-05-05 RX ORDER — POLYETHYLENE GLYCOL 3350 17 G/17G
17 POWDER, FOR SOLUTION ORAL DAILY PRN
Status: DISCONTINUED | OUTPATIENT
Start: 2018-05-05 | End: 2018-05-06 | Stop reason: HOSPADM

## 2018-05-05 RX ORDER — HYDROCODONE BITARTRATE AND ACETAMINOPHEN 5; 325 MG/1; MG/1
1 TABLET ORAL ONCE
Status: COMPLETED | OUTPATIENT
Start: 2018-05-05 | End: 2018-05-05

## 2018-05-05 RX ORDER — AMOXICILLIN 250 MG
1 CAPSULE ORAL 2 TIMES DAILY
Status: DISCONTINUED | OUTPATIENT
Start: 2018-05-05 | End: 2018-05-06 | Stop reason: HOSPADM

## 2018-05-05 RX ORDER — SODIUM CHLORIDE 9 MG/ML
100 INJECTION, SOLUTION INTRAVENOUS CONTINUOUS
Status: DISCONTINUED | OUTPATIENT
Start: 2018-05-06 | End: 2018-05-06

## 2018-05-05 RX ORDER — ONDANSETRON 2 MG/ML
4 INJECTION INTRAMUSCULAR; INTRAVENOUS EVERY 4 HOURS PRN
Status: DISCONTINUED | OUTPATIENT
Start: 2018-05-05 | End: 2018-05-06 | Stop reason: HOSPADM

## 2018-05-05 RX ORDER — OXYCODONE HYDROCHLORIDE 5 MG/1
2.5 TABLET ORAL EVERY 4 HOURS PRN
Status: DISCONTINUED | OUTPATIENT
Start: 2018-05-05 | End: 2018-05-06 | Stop reason: HOSPADM

## 2018-05-05 RX ORDER — LIDOCAINE 50 MG/G
1 PATCH TOPICAL DAILY
Status: DISCONTINUED | OUTPATIENT
Start: 2018-05-06 | End: 2018-05-06 | Stop reason: HOSPADM

## 2018-05-05 RX ORDER — OXYCODONE HYDROCHLORIDE 5 MG/1
5 TABLET ORAL EVERY 4 HOURS PRN
Status: DISCONTINUED | OUTPATIENT
Start: 2018-05-05 | End: 2018-05-06 | Stop reason: HOSPADM

## 2018-05-05 RX ORDER — ACETAMINOPHEN 325 MG/1
975 TABLET ORAL EVERY 8 HOURS SCHEDULED
Status: DISCONTINUED | OUTPATIENT
Start: 2018-05-05 | End: 2018-05-06 | Stop reason: HOSPADM

## 2018-05-05 RX ORDER — MORPHINE SULFATE 10 MG/ML
6 INJECTION, SOLUTION INTRAMUSCULAR; INTRAVENOUS ONCE
Status: COMPLETED | OUTPATIENT
Start: 2018-05-05 | End: 2018-05-05

## 2018-05-05 RX ORDER — ONDANSETRON 2 MG/ML
4 INJECTION INTRAMUSCULAR; INTRAVENOUS ONCE
Status: COMPLETED | OUTPATIENT
Start: 2018-05-05 | End: 2018-05-05

## 2018-05-05 RX ADMIN — ACETAMINOPHEN 975 MG: 325 TABLET, FILM COATED ORAL at 21:25

## 2018-05-05 RX ADMIN — IOHEXOL 100 ML: 350 INJECTION, SOLUTION INTRAVENOUS at 17:32

## 2018-05-05 RX ADMIN — MORPHINE SULFATE 6 MG: 10 INJECTION INTRAVENOUS at 18:56

## 2018-05-05 RX ADMIN — HYDROCODONE BITARTRATE AND ACETAMINOPHEN 1 TABLET: 5; 325 TABLET ORAL at 16:18

## 2018-05-05 RX ADMIN — ONDANSETRON 4 MG: 2 INJECTION INTRAMUSCULAR; INTRAVENOUS at 18:56

## 2018-05-05 NOTE — EMTALA/ACUTE CARE TRANSFER
[address] [address]  Dept: [phone]      [facility] TRANSFER CONSENT    NAME Laine Hernández                                         1945                              MRN [id number]    I have been informed of my rights regarding examination, treatment, and transfer   by Dr Shayy Morton MD    Benefits: Specialized equipment and/or services available at the receiving facility (Include comment)________________________    Risks: Potential for delay in receiving treatment      Transfer Request   I acknowledge that my medical condition has been evaluated and explained to me by the emergency department physician or other qualified medical person and/or my attending physician who has recommended and offered to me further medical examination and treatment  I understand the Hospital's obligation with respect to the treatment and stabilization of my emergency medical condition  I nevertheless request to be transferred  I release the Hospital, the doctor, and any other persons caring for me from all responsibility or liability for any injury or ill effects that may result from my transfer and agree to accept all responsibility for the consequences of my choice to transfer, rather than receive stabilizing treatment at the Hospital  I understand that because the transfer is my request, my insurance may not provide reimbursement for the services  The Hospital will assist and direct me and my family in how to make arrangements for transfer, but the hospital is not liable for any fees charged by the transport service  In spite of this understanding, I refuse to consent to further medical examination and treatment which has been offered to me, and request transfer to  Keith Kelly Name, [address]   I authorize the performance of emergency medical procedures and treatments upon me in both transit and upon arrival at the receiving facility  Additionally, I authorize the release of any and all medical records to the receiving facility and request they be transported with me, if possible  I authorize the performance of emergency medical procedures and treatments upon me in both transit and upon arrival at the receiving facility  Additionally, I authorize the release of any and all medical records to the receiving facility and request they be transported with me, if possible  I understand that the safest mode of transportation during a medical emergency is an ambulance and that the Hospital advocates the use of this mode of transport  Risks of traveling to the receiving facility by car, including absence of medical control, life sustaining equipment, such as oxygen, and medical personnel has been explained to me and I fully understand them  (ALIN CORRECT BOX BELOW)  [  ]  I consent to the stated transfer and to be transported by ambulance/helicopter  [  ]  I consent to the stated transfer, but refuse transportation by ambulance and accept full responsibility for my transportation by car  I understand the risks of non-ambulance transfers and I exonerate the Hospital and its staff from any deterioration in my condition that results from this refusal     X___________________________________________    DATE  18  TIME________  Signature of patient or legally responsible individual signing on patient behalf           RELATIONSHIP TO PATIENT_________________________          Provider Certification    NAME Alyseanup Joent Kelechi                                        [facility] 1945                              MRN [id number]    A medical screening exam was performed on the above named patient  Based on the examination:    Condition Necessitating Transfer The encounter diagnosis was C2 cervical fracture ([facility] [address] )      Patient Condition: The patient has been stabilized such that within reasonable medical probability, no material deterioration of the patient condition or the condition of the unborn child(chele) is likely to result from the transfer    Reason for Transfer: Level of Care needed not available at this facility    Transfer Requirements: [address]   · Space available and qualified personnel available for treatment as acknowledged by Patient access center  · Agreed to accept transfer and to provide appropriate medical treatment as acknowledged by       Dr Tawny Tolliver  · Appropriate medical records of the examination and treatment of the patient are provided at the time of transfer   [address] _______  · Transfer will be performed by qualified personnel from [facility] emergency transport  and appropriate transfer equipment as required, including the use of necessary and appropriate life support measures  Provider Certification: I have examined the patient and explained the following risks and benefits of being transferred/refusing transfer to the patient/family:  General risk, such as traffic hazards, adverse weather conditions, rough terrain or turbulence, possible failure of equipment (including vehicle or aircraft), or consequences of actions of persons outside the control of the transport personnel      Based on these reasonable risks and benefits to the patient and/or the unborn child(chele), and based upon the information available at the time of the patients examination, I certify that the medical benefits reasonably to be expected from the provision of appropriate medical treatments at another medical facility outweigh the increasing risks, if any, to the individuals medical condition, and in the case of labor to the unborn child, from effecting the transfer      X____________________________________________ DATE 05/05/18        TIME_______      ORIGINAL - SEND TO MEDICAL RECORDS   COPY - SEND WITH PATIENT DURING TRANSFER
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
## 2018-05-06 ENCOUNTER — APPOINTMENT (OUTPATIENT)
Dept: RADIOLOGY | Facility: HOSPITAL | Age: 73
End: 2018-05-06
Payer: COMMERCIAL

## 2018-05-06 VITALS
SYSTOLIC BLOOD PRESSURE: 125 MMHG | DIASTOLIC BLOOD PRESSURE: 69 MMHG | BODY MASS INDEX: 24.54 KG/M2 | WEIGHT: 147.27 LBS | OXYGEN SATURATION: 98 % | HEIGHT: 65 IN | RESPIRATION RATE: 18 BRPM | TEMPERATURE: 98.1 F | HEART RATE: 98 BPM

## 2018-05-06 LAB
ANION GAP SERPL CALCULATED.3IONS-SCNC: 6 MMOL/L (ref 4–13)
BUN SERPL-MCNC: 11 MG/DL (ref 5–25)
CALCIUM SERPL-MCNC: 8.7 MG/DL (ref 8.3–10.1)
CHLORIDE SERPL-SCNC: 109 MMOL/L (ref 100–108)
CO2 SERPL-SCNC: 26 MMOL/L (ref 21–32)
CREAT SERPL-MCNC: 0.56 MG/DL (ref 0.6–1.3)
ERYTHROCYTE [DISTWIDTH] IN BLOOD BY AUTOMATED COUNT: 13.8 % (ref 11.6–15.1)
GFR SERPL CREATININE-BSD FRML MDRD: 94 ML/MIN/1.73SQ M
GLUCOSE SERPL-MCNC: 101 MG/DL (ref 65–140)
HCT VFR BLD AUTO: 39.5 % (ref 34.8–46.1)
HGB BLD-MCNC: 12.7 G/DL (ref 11.5–15.4)
INR PPP: 1.11 (ref 0.86–1.16)
MCH RBC QN AUTO: 28.7 PG (ref 26.8–34.3)
MCHC RBC AUTO-ENTMCNC: 32.2 G/DL (ref 31.4–37.4)
MCV RBC AUTO: 89 FL (ref 82–98)
PLATELET # BLD AUTO: 222 THOUSANDS/UL (ref 149–390)
PMV BLD AUTO: 8.9 FL (ref 8.9–12.7)
POTASSIUM SERPL-SCNC: 4 MMOL/L (ref 3.5–5.3)
PROTHROMBIN TIME: 14.3 SECONDS (ref 12.1–14.4)
RBC # BLD AUTO: 4.42 MILLION/UL (ref 3.81–5.12)
SODIUM SERPL-SCNC: 141 MMOL/L (ref 136–145)
WBC # BLD AUTO: 5.5 THOUSAND/UL (ref 4.31–10.16)

## 2018-05-06 PROCEDURE — G8978 MOBILITY CURRENT STATUS: HCPCS

## 2018-05-06 PROCEDURE — G8987 SELF CARE CURRENT STATUS: HCPCS | Performed by: NEUROLOGICAL SURGERY

## 2018-05-06 PROCEDURE — G8980 MOBILITY D/C STATUS: HCPCS

## 2018-05-06 PROCEDURE — 85027 COMPLETE CBC AUTOMATED: CPT | Performed by: EMERGENCY MEDICINE

## 2018-05-06 PROCEDURE — G8979 MOBILITY GOAL STATUS: HCPCS

## 2018-05-06 PROCEDURE — 80048 BASIC METABOLIC PNL TOTAL CA: CPT | Performed by: EMERGENCY MEDICINE

## 2018-05-06 PROCEDURE — 99217 PR OBSERVATION CARE DISCHARGE MANAGEMENT: CPT | Performed by: PHYSICIAN ASSISTANT

## 2018-05-06 PROCEDURE — 85610 PROTHROMBIN TIME: CPT | Performed by: EMERGENCY MEDICINE

## 2018-05-06 PROCEDURE — 72040 X-RAY EXAM NECK SPINE 2-3 VW: CPT

## 2018-05-06 PROCEDURE — 97535 SELF CARE MNGMENT TRAINING: CPT

## 2018-05-06 PROCEDURE — G8988 SELF CARE GOAL STATUS: HCPCS

## 2018-05-06 PROCEDURE — G8989 SELF CARE D/C STATUS: HCPCS

## 2018-05-06 PROCEDURE — 97166 OT EVAL MOD COMPLEX 45 MIN: CPT

## 2018-05-06 PROCEDURE — 97163 PT EVAL HIGH COMPLEX 45 MIN: CPT

## 2018-05-06 RX ORDER — OXYCODONE HYDROCHLORIDE 5 MG/1
2.5-5 TABLET ORAL EVERY 4 HOURS PRN
Qty: 30 TABLET | Refills: 0 | Status: SHIPPED | OUTPATIENT
Start: 2018-05-06 | End: 2018-05-24

## 2018-05-06 RX ORDER — AMOXICILLIN 250 MG
1 CAPSULE ORAL 2 TIMES DAILY
Refills: 0
Start: 2018-05-06 | End: 2019-03-05

## 2018-05-06 RX ORDER — METHOCARBAMOL 500 MG/1
500 TABLET, FILM COATED ORAL EVERY 6 HOURS PRN
Qty: 40 TABLET | Refills: 0 | Status: SHIPPED | OUTPATIENT
Start: 2018-05-06 | End: 2018-05-24

## 2018-05-06 RX ORDER — ACETAMINOPHEN 325 MG/1
975 TABLET ORAL EVERY 8 HOURS
Qty: 30 TABLET | Refills: 0 | Status: SHIPPED | OUTPATIENT
Start: 2018-05-06 | End: 2018-05-24

## 2018-05-06 RX ORDER — POLYETHYLENE GLYCOL 3350 17 G/17G
17 POWDER, FOR SOLUTION ORAL DAILY PRN
Qty: 14 EACH | Refills: 0
Start: 2018-05-06 | End: 2019-03-05

## 2018-05-06 RX ADMIN — OXYCODONE HYDROCHLORIDE 5 MG: 5 TABLET ORAL at 02:33

## 2018-05-06 RX ADMIN — OXYCODONE HYDROCHLORIDE 5 MG: 5 TABLET ORAL at 14:17

## 2018-05-06 RX ADMIN — OXYCODONE HYDROCHLORIDE 5 MG: 5 TABLET ORAL at 16:35

## 2018-05-06 RX ADMIN — Medication 1 TABLET: at 11:21

## 2018-05-06 RX ADMIN — LIDOCAINE 1 PATCH: 50 PATCH TOPICAL at 11:21

## 2018-05-06 RX ADMIN — OXYCODONE HYDROCHLORIDE 5 MG: 5 TABLET ORAL at 07:53

## 2018-05-06 RX ADMIN — ACETAMINOPHEN 975 MG: 325 TABLET, FILM COATED ORAL at 14:17

## 2018-05-06 RX ADMIN — SODIUM CHLORIDE 100 ML/HR: 0.9 INJECTION, SOLUTION INTRAVENOUS at 00:32
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
## 2018-05-06 NOTE — ASSESSMENT & PLAN NOTE
- Status post fall with C2 fracture  - Appreciate neurosurgery evaluation and recommendations   - Non operative management with cervical collar at all times  - Upright cervical spine x-rays reviewed with Neurosurgery  Alignment appears stable  - PT and OT evaluation and treatment recommended home with family support  - Continue all current analgesic regimen   - Stable for discharge on 05/06/2018  Outpatient follow-up in 2 weeks with Neurosurgery and repeat x-rays 2-3 days prior to follow-up
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
## 2018-05-06 NOTE — PHYSICAL THERAPY NOTE
PT TREATMENT       05/06/18 1040   Pain Assessment   Pain Assessment 0-10   Pain Score 7   Pain Type Acute pain   Pain Location Neck   Restrictions/Precautions   Braces or Orthoses C/S Collar   Other Precautions Spinal precautions;Pain; Fall Risk   General   Chart Reviewed Yes   Response to Previous Treatment Patient with no complaints from previous session  Family/Caregiver Present No   Cognition   Overall Cognitive Status WFL   Subjective   Subjective "ITS GOING TO BE HARD TO MOVE LIKE THIS"   Transfers   Sit to Stand 5  Supervision   Stand to Sit 5  Supervision   Ambulation/Elevation   Gait pattern Excessively slow   Gait Assistance 5  Supervision   Assistive Device None   Distance 50 FEET   Stair Management Assistance 5  Supervision   Additional items Verbal cues   Stair Management Technique One rail R;Foreward;Nonreciprocal;Reciprocal   Number of Stairs 10   Balance   Static Sitting Good   Static Standing Good   Ambulatory Good   Endurance Deficit   Endurance Deficit Yes   Endurance Deficit Description PAIN   Activity Tolerance   Activity Tolerance Patient tolerated treatment well   Nurse Made Aware [facility]    Assessment   Prognosis Good   Problem List Pain;Decreased endurance; Impaired balance   Assessment PT INITIATED TREATMENT SESSION IN ORDER TO ASSIST PATIENT IN ACHIEVING GOALS TO IMPROVE TRANSFERS AND AMBULATION AND TO TRIAL STAIR NAVIGATION AND PROVIDE PATIENT EDUCATION/HOME MANAGEMENT TRAINING IN ANTICIPATION OF D/C HOME TODAY  PATIENT REPORTING 7/10 PAIN IN NECK  CERVICAL COLLAR DONNED  PATIENT WAS ABLE TO AMBULATE 50 FEET AND NAVIGATE 10 STEPS RECIPROCALLY DURING ASCENT AND NON-RECIPROCALLY DURING DESCENT S LEVEL  EDUCATION WAS PROVIDED ON CERVICAL SPINE PRECAUTIONS (NO BENDING AT NECK, TWISTING AT NECK, OR LIFTING >10 LBS)  ADDRESSED PATIENT'S SPECIFIC QUESTIONS ABOUT RESUMING ACTIVITIES (CLEANING, WORKING OUT)  PATIENT ALSO ASKING WHEN SHE WILL RECEIVE PHYSICAL THERAPY FOR HER NECK   PT REPORTED PATIENT WILL LEARN AT FOLLOW UP APPOINTMENT TYPICALLY SCHEDULED IN TWO WEEKS  PATIENT DENYING FURTHER QUESTIONS/CONCERNS ABOUT D/C HOME AT THIS POINT IN TIME  SHE DEMONSTRATES SAFE/EFFECTIVE MOBILITY  RECOMMEND D/C HOME WITH FAMILY ASSIST PRN WHEN MEDICALLY APPROPRIATE  D/C INPT SERVICES      Goals   Patient Goals TO GO HOME TODAY    STG Expiration Date 05/09/18   Treatment Day 1   Plan   Progress Discontinue PT   Recommendation   Recommendation Home with family support   Equipment Recommended (NONE)   PT - OK to Discharge Yes  (HOME W/ FAMILY ASSIST PRN WHEN MED ARAVIND )   Pérez Hoffman, PT

## 2018-05-06 NOTE — PLAN OF CARE
Problem: PHYSICAL THERAPY ADULT  Goal: Performs mobility at highest level of function for planned discharge setting  See evaluation for individualized goals  Treatment/Interventions: OT, Spoke to nursing, Spoke to MD, Gait training, Bed mobility, Patient/family training, Elevations, Functional transfer training  Equipment Recommended:  (NONE)       See flowsheet documentation for full assessment, interventions and recommendations  Outcome: Completed Date Met: 05/06/18  Prognosis: Good  Problem List: Pain, Decreased endurance, Impaired balance  Assessment: PT INITIATED TREATMENT SESSION IN ORDER TO ASSIST PATIENT IN ACHIEVING GOALS TO IMPROVE TRANSFERS AND AMBULATION AND TO TRIAL STAIR NAVIGATION AND PROVIDE PATIENT EDUCATION/HOME MANAGEMENT TRAINING IN ANTICIPATION OF D/C HOME TODAY  PATIENT REPORTING 7/10 PAIN IN NECK  CERVICAL COLLAR DONNED  PATIENT WAS ABLE TO AMBULATE 50 FEET AND NAVIGATE 10 STEPS RECIPROCALLY DURING ASCENT AND NON-RECIPROCALLY DURING DESCENT S LEVEL  EDUCATION WAS PROVIDED ON CERVICAL SPINE PRECAUTIONS (NO BENDING AT NECK, TWISTING AT NECK, OR LIFTING >10 LBS)  ADDRESSED PATIENT'S SPECIFIC QUESTIONS ABOUT RESUMING ACTIVITIES (CLEANING, WORKING OUT)  PATIENT ALSO ASKING WHEN SHE WILL RECEIVE PHYSICAL THERAPY FOR HER NECK  PT REPORTED PATIENT WILL LEARN AT FOLLOW UP APPOINTMENT TYPICALLY SCHEDULED IN TWO WEEKS  PATIENT DENYING FURTHER QUESTIONS/CONCERNS ABOUT D/C HOME AT THIS POINT IN TIME  SHE DEMONSTRATES SAFE/EFFECTIVE MOBILITY  RECOMMEND D/C HOME WITH FAMILY ASSIST PRN WHEN MEDICALLY APPROPRIATE  D/C INPT SERVICES  Recommendation: (S) Home with family support     PT - OK to Discharge: (S) Yes (HOME W/ FAMILY ASSIST PRN WHEN MED ARAVIND )    See flowsheet documentation for full assessment     Charlotte Lantigua, PT

## 2018-05-06 NOTE — DISCHARGE INSTRUCTIONS
Traumatic Cervical Spine Fracture Discharge Instructions:    - Wear VISTA cervical collar at all times except for showering  May change to [place] collar for showering/bathing     - No heavy lifting  No strenuous activities  NO DRIVING  - Please notify MD and/or return to ER immediately if you have increased neck or arm pain  New numbness and/or weakness in your arm  Difficulty swallowing or breathing especially while lying down  Numbness, tingling or weakness in any of your arms or legs

## 2018-05-08 ENCOUNTER — APPOINTMENT (OUTPATIENT)
Dept: PHYSICAL THERAPY | Facility: CLINIC | Age: 73
End: 2018-05-08
Payer: COMMERCIAL

## 2018-05-10 ENCOUNTER — TELEPHONE (OUTPATIENT)
Dept: NEUROSURGERY | Facility: CLINIC | Age: 73
End: 2018-05-10

## 2018-05-10 ENCOUNTER — APPOINTMENT (OUTPATIENT)
Dept: PHYSICAL THERAPY | Facility: CLINIC | Age: 73
End: 2018-05-10
Payer: COMMERCIAL

## 2018-05-10 NOTE — TELEPHONE ENCOUNTER
05/10/2018-RECEIVED CALL FROM PT TO SCHEDULE [facility] F/U (AS NOTED IN 05/06/2018 DISCHARGE INSTRUCTIONS)  PT IS SCHEDULED ON 05/24/2018 AND WAS TOLD TO HAVE XRAY 2-3 DAYS PRIOR TO APPT

## 2018-05-11 DIAGNOSIS — S12.191D OTHER CLOSED NONDISPLACED FRACTURE OF SECOND CERVICAL VERTEBRA WITH ROUTINE HEALING, SUBSEQUENT ENCOUNTER: Primary | ICD-10-CM

## 2018-05-14 ENCOUNTER — TELEPHONE (OUTPATIENT)
Dept: SURGERY | Facility: CLINIC | Age: 73
End: 2018-05-14
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
## 2018-05-15 ENCOUNTER — APPOINTMENT (OUTPATIENT)
Dept: PHYSICAL THERAPY | Facility: CLINIC | Age: 73
End: 2018-05-15
Payer: COMMERCIAL

## 2018-05-17 ENCOUNTER — APPOINTMENT (OUTPATIENT)
Dept: PHYSICAL THERAPY | Facility: CLINIC | Age: 73
End: 2018-05-17
Payer: COMMERCIAL

## 2018-05-17 NOTE — TELEPHONE ENCOUNTER
Patient should follow-up with Neurosurgery as well as PCP  She does not need a trauma follow-up; therefore we will not be prescribing her pain medications

## 2018-05-21 ENCOUNTER — HOSPITAL ENCOUNTER (OUTPATIENT)
Dept: RADIOLOGY | Facility: HOSPITAL | Age: 73
Discharge: HOME | End: 2018-05-21
Payer: COMMERCIAL

## 2018-05-21 DIAGNOSIS — S12.191D OTHER CLOSED NONDISPLACED FRACTURE OF SECOND CERVICAL VERTEBRA WITH ROUTINE HEALING, SUBSEQUENT ENCOUNTER: ICD-10-CM

## 2018-05-21 PROCEDURE — 72040 X-RAY EXAM NECK SPINE 2-3 VW: CPT

## 2018-05-24 ENCOUNTER — OFFICE VISIT (OUTPATIENT)
Dept: NEUROSURGERY | Facility: CLINIC | Age: 73
End: 2018-05-24
Payer: COMMERCIAL

## 2018-05-24 VITALS
HEART RATE: 69 BPM | WEIGHT: 142 LBS | DIASTOLIC BLOOD PRESSURE: 77 MMHG | RESPIRATION RATE: 16 BRPM | TEMPERATURE: 97 F | SYSTOLIC BLOOD PRESSURE: 139 MMHG | BODY MASS INDEX: 23.66 KG/M2 | HEIGHT: 65 IN

## 2018-05-24 DIAGNOSIS — S12.101D CLOSED NONDISPLACED FRACTURE OF SECOND CERVICAL VERTEBRA WITH ROUTINE HEALING, UNSPECIFIED FRACTURE MORPHOLOGY, SUBSEQUENT ENCOUNTER: Primary | ICD-10-CM

## 2018-05-24 PROCEDURE — 99213 OFFICE O/P EST LOW 20 MIN: CPT | Performed by: PHYSICIAN ASSISTANT

## 2018-05-24 RX ORDER — GUARN/MA-HUANG/P.GIN/S.GINSENG
1 TABLET ORAL 2 TIMES DAILY
COMMUNITY

## 2018-05-24 RX ORDER — DIPHENOXYLATE HYDROCHLORIDE AND ATROPINE SULFATE 2.5; .025 MG/1; MG/1
1 TABLET ORAL DAILY
COMMUNITY
End: 2019-03-05
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
## 2018-05-24 NOTE — PATIENT INSTRUCTIONS
Activities as discussed, lifting no greater than 10 lb, ambulation as tolerated, avoid bending  Continue with cervical collar all times, and utilized the [place] collar (soft) for shower/bathing  Follow-up in approximately 4 weeks with repeat plain films of the cervical spine as requested  Follow with primary care provider Dr Fransisco Montenegro per usual protocol
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
## 2018-05-26 ENCOUNTER — APPOINTMENT (EMERGENCY)
Dept: CT IMAGING | Facility: HOSPITAL | Age: 73
End: 2018-05-26
Payer: COMMERCIAL

## 2018-05-26 ENCOUNTER — HOSPITAL ENCOUNTER (EMERGENCY)
Facility: HOSPITAL | Age: 73
Discharge: HOME | End: 2018-05-27
Attending: EMERGENCY MEDICINE | Admitting: EMERGENCY MEDICINE
Payer: COMMERCIAL

## 2018-05-26 DIAGNOSIS — M62.830 BACK SPASM: Primary | ICD-10-CM

## 2018-05-26 LAB
ALBUMIN SERPL BCP-MCNC: 4 G/DL (ref 3.5–5)
ALP SERPL-CCNC: 151 U/L (ref 46–116)
ALT SERPL W P-5'-P-CCNC: 21 U/L (ref 12–78)
ANION GAP SERPL CALCULATED.3IONS-SCNC: 6 MMOL/L (ref 4–13)
AST SERPL W P-5'-P-CCNC: 13 U/L (ref 5–45)
BASOPHILS # BLD AUTO: 0.03 THOUSANDS/ΜL (ref 0–0.1)
BASOPHILS NFR BLD AUTO: 0 % (ref 0–1)
BILIRUB SERPL-MCNC: 0.8 MG/DL (ref 0.2–1)
BUN SERPL-MCNC: 16 MG/DL (ref 5–25)
CALCIUM SERPL-MCNC: 9.5 MG/DL (ref 8.3–10.1)
CHLORIDE SERPL-SCNC: 105 MMOL/L (ref 100–108)
CO2 SERPL-SCNC: 28 MMOL/L (ref 21–32)
CREAT SERPL-MCNC: 0.76 MG/DL (ref 0.6–1.3)
EOSINOPHIL # BLD AUTO: 0.11 THOUSAND/ΜL (ref 0–0.61)
EOSINOPHIL NFR BLD AUTO: 1 % (ref 0–6)
ERYTHROCYTE [DISTWIDTH] IN BLOOD BY AUTOMATED COUNT: 13.6 % (ref 11.6–15.1)
GFR SERPL CREATININE-BSD FRML MDRD: 79 ML/MIN/1.73SQ M
GLUCOSE SERPL-MCNC: 122 MG/DL (ref 65–140)
HCT VFR BLD AUTO: 39.3 % (ref 34.8–46.1)
HGB BLD-MCNC: 13 G/DL (ref 11.5–15.4)
IMM GRANULOCYTES # BLD AUTO: 0.02 THOUSAND/UL (ref 0–0.2)
IMM GRANULOCYTES NFR BLD AUTO: 0 % (ref 0–2)
LYMPHOCYTES # BLD AUTO: 1.15 THOUSANDS/ΜL (ref 0.6–4.47)
LYMPHOCYTES NFR BLD AUTO: 15 % (ref 14–44)
MCH RBC QN AUTO: 28.8 PG (ref 26.8–34.3)
MCHC RBC AUTO-ENTMCNC: 33.1 G/DL (ref 31.4–37.4)
MCV RBC AUTO: 87 FL (ref 82–98)
MONOCYTES # BLD AUTO: 0.57 THOUSAND/ΜL (ref 0.17–1.22)
MONOCYTES NFR BLD AUTO: 7 % (ref 4–12)
NEUTROPHILS # BLD AUTO: 5.83 THOUSANDS/ΜL (ref 1.85–7.62)
NEUTS SEG NFR BLD AUTO: 77 % (ref 43–75)
NRBC BLD AUTO-RTO: 0 /100 WBCS
PLATELET # BLD AUTO: 310 THOUSANDS/UL (ref 149–390)
PMV BLD AUTO: 8.8 FL (ref 8.9–12.7)
POTASSIUM SERPL-SCNC: 4.4 MMOL/L (ref 3.5–5.3)
PROT SERPL-MCNC: 7.4 G/DL (ref 6.4–8.2)
RBC # BLD AUTO: 4.52 MILLION/UL (ref 3.81–5.12)
SODIUM SERPL-SCNC: 139 MMOL/L (ref 136–145)
TROPONIN I SERPL-MCNC: <0.02 NG/ML
WBC # BLD AUTO: 7.71 THOUSAND/UL (ref 4.31–10.16)

## 2018-05-26 PROCEDURE — 36415 COLL VENOUS BLD VENIPUNCTURE: CPT | Performed by: EMERGENCY MEDICINE

## 2018-05-26 PROCEDURE — 93005 ELECTROCARDIOGRAM TRACING: CPT

## 2018-05-26 PROCEDURE — 96375 TX/PRO/DX INJ NEW DRUG ADDON: CPT

## 2018-05-26 PROCEDURE — 71275 CT ANGIOGRAPHY CHEST: CPT

## 2018-05-26 PROCEDURE — 84484 ASSAY OF TROPONIN QUANT: CPT | Performed by: EMERGENCY MEDICINE

## 2018-05-26 PROCEDURE — 96374 THER/PROPH/DIAG INJ IV PUSH: CPT

## 2018-05-26 PROCEDURE — 85025 COMPLETE CBC W/AUTO DIFF WBC: CPT | Performed by: EMERGENCY MEDICINE

## 2018-05-26 PROCEDURE — 80053 COMPREHEN METABOLIC PANEL: CPT | Performed by: EMERGENCY MEDICINE

## 2018-05-26 RX ORDER — MORPHINE SULFATE 4 MG/ML
4 INJECTION, SOLUTION INTRAMUSCULAR; INTRAVENOUS ONCE
Status: COMPLETED | OUTPATIENT
Start: 2018-05-26 | End: 2018-05-26

## 2018-05-26 RX ORDER — KETOROLAC TROMETHAMINE 30 MG/ML
15 INJECTION, SOLUTION INTRAMUSCULAR; INTRAVENOUS ONCE
Status: COMPLETED | OUTPATIENT
Start: 2018-05-26 | End: 2018-05-26

## 2018-05-26 RX ORDER — ACETAMINOPHEN 325 MG/1
975 TABLET ORAL ONCE
Status: DISCONTINUED | OUTPATIENT
Start: 2018-05-26 | End: 2018-05-27 | Stop reason: HOSPADM

## 2018-05-26 RX ADMIN — MORPHINE SULFATE 4 MG: 4 INJECTION INTRAVENOUS at 22:30

## 2018-05-26 RX ADMIN — KETOROLAC TROMETHAMINE 15 MG: 30 INJECTION, SOLUTION INTRAMUSCULAR at 22:33

## 2018-05-27 VITALS
SYSTOLIC BLOOD PRESSURE: 122 MMHG | OXYGEN SATURATION: 96 % | BODY MASS INDEX: 23.66 KG/M2 | TEMPERATURE: 97.9 F | DIASTOLIC BLOOD PRESSURE: 57 MMHG | HEART RATE: 97 BPM | HEIGHT: 65 IN | RESPIRATION RATE: 18 BRPM | WEIGHT: 142 LBS

## 2018-05-27 LAB
ATRIAL RATE: 100 BPM
P AXIS: 78 DEGREES
PR INTERVAL: 176 MS
QRS AXIS: 80 DEGREES
QRSD INTERVAL: 94 MS
QT INTERVAL: 356 MS
QTC INTERVAL: 459 MS
T WAVE AXIS: 43 DEGREES
VENTRICULAR RATE: 100 BPM

## 2018-05-27 PROCEDURE — 99284 EMERGENCY DEPT VISIT MOD MDM: CPT

## 2018-05-27 PROCEDURE — 96376 TX/PRO/DX INJ SAME DRUG ADON: CPT

## 2018-05-27 PROCEDURE — 93010 ELECTROCARDIOGRAM REPORT: CPT | Performed by: INTERNAL MEDICINE

## 2018-05-27 RX ORDER — OXYCODONE HYDROCHLORIDE 5 MG/1
5 TABLET ORAL EVERY 8 HOURS PRN
Qty: 8 TABLET | Refills: 0 | Status: SHIPPED | OUTPATIENT
Start: 2018-05-27 | End: 2018-05-30

## 2018-05-27 RX ORDER — IBUPROFEN 400 MG/1
400 TABLET ORAL EVERY 6 HOURS PRN
Qty: 30 TABLET | Refills: 0 | Status: SHIPPED | OUTPATIENT
Start: 2018-05-27 | End: 2018-06-21 | Stop reason: ALTCHOICE

## 2018-05-27 RX ORDER — MORPHINE SULFATE 4 MG/ML
4 INJECTION, SOLUTION INTRAMUSCULAR; INTRAVENOUS ONCE
Status: COMPLETED | OUTPATIENT
Start: 2018-05-27 | End: 2018-05-27

## 2018-05-27 RX ORDER — ACETAMINOPHEN 325 MG/1
650 TABLET ORAL EVERY 6 HOURS PRN
Qty: 30 TABLET | Refills: 0 | Status: SHIPPED | OUTPATIENT
Start: 2018-05-27 | End: 2018-06-01

## 2018-05-27 RX ADMIN — IOHEXOL 100 ML: 350 INJECTION, SOLUTION INTRAVENOUS at 00:48

## 2018-05-27 RX ADMIN — MORPHINE SULFATE 4 MG: 4 INJECTION INTRAVENOUS at 02:53

## 2018-05-27 NOTE — DISCHARGE INSTRUCTIONS
Back Pain   WHAT YOU NEED TO KNOW:   What should I know about back pain? Back pain is common  You may feel sore or stiff on one or both sides of your back  The pain may spread to your buttocks or thighs  What causes or increases my risk for back pain? Conditions that affect the spine, joints, or muscles can cause back pain  These may include arthritis, spinal stenosis (narrowing of the spinal column), muscle tension, or breakdown of the spinal discs  The following increase your risk of back pain:  · Repeated bending, lifting, or twisting, or lifting heavy items    · Injury from a fall or accident    · Lack of regular physical activity     · Obesity, pregnancy     · Smoking    · Aging    · Driving, sitting, or standing for long periods    · Bad posture while sitting or standing  How is back pain diagnosed? Your healthcare provider will ask if you have any medical conditions  He may ask if you have a history of back pain and how it started  He may watch you stand and walk, and check your range of motion  Show him where you feel pain and what makes it better or worse  Describe the pain, how bad it is, and how long it lasts  Tell him if your pain worsens at night or when you lie on your back  How is back pain treated? · NSAIDs  help decrease swelling and pain  This medicine is available with or without a doctor's order  NSAIDs can cause stomach bleeding or kidney problems in certain people  If you take blood thinner medicine, always ask your healthcare provider if NSAIDs are safe for you  Always read the medicine label and follow directions  · Acetaminophen  decreases pain  It is available without a doctor's order  Ask how much to take and how often to take it  Follow directions  Acetaminophen can cause liver damage if not taken correctly  · Prescription pain medicine  may be given  Ask your healthcare provider how to take this medicine safely  How do I manage my back pain?    · Apply ice  on your back or affected area for 15 to 20 minutes every hour or as directed  Use an ice pack, or put crushed ice in a plastic bag  Cover it with a towel  Ice helps prevent tissue damage and decreases pain  · Apply heat  on your back or affected area for 20 to 30 minutes every 2 hours for as many days as directed  Heat helps decrease pain and muscle spasms  · Stay active  as much as you can without causing more pain  Bed rest could make your back pain worse  Avoid heavy lifting until your pain is gone  When should I contact my healthcare provider? · You have back pain that does not get better with rest and pain medicine  · You have a fever  · You have pain that worsens when you are on your back or when you rest     · You have pain that worsens when you cough or sneeze  · You lose weight without trying  · You have questions or concerns about your condition or care  When should I seek immediate care or call 911? · You have pain, numbness, or weakness in one or both legs  · Your pain becomes so severe that you cannot walk  · You cannot control your urine or bowel movements  · You have severe back pain with chest pain  · You have severe back pain, nausea, and vomiting  · You have severe back pain that spreads to your side or genital area  CARE AGREEMENT:   You have the right to help plan your care  Learn about your health condition and how it may be treated  Discuss treatment options with your caregivers to decide what care you want to receive  You always have the right to refuse treatment  The above information is an  only  It is not intended as medical advice for individual conditions or treatments  Talk to your doctor, nurse or pharmacist before following any medical regimen to see if it is safe and effective for you  © 2017 [employer] Information is for End User's use only and may not be sold, redistributed or otherwise used for commercial purposes   All illustrations and images included in CareNotes® are the copyrighted property of A [employer] M [employer]  or [employer]
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
## 2018-05-27 NOTE — ED NOTES
Pt requesting pain medications  Made pt aware that tylenol can be given  Pt reports she was already offered tylenol and refused  Made pt aware she would not be able to get something different for pain until seen by doctor  Pt acknowledges and states she would like to take the tylenol now        Susie Godwin RN  05/27/18 0776
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
## 2018-05-29 ENCOUNTER — TELEPHONE (OUTPATIENT)
Dept: NEUROSURGERY | Facility: CLINIC | Age: 73
End: 2018-05-29

## 2018-05-30 ENCOUNTER — HOSPITAL ENCOUNTER (EMERGENCY)
Facility: HOSPITAL | Age: 73
Discharge: HOME | End: 2018-05-30
Attending: EMERGENCY MEDICINE | Admitting: EMERGENCY MEDICINE
Payer: COMMERCIAL

## 2018-05-30 VITALS
TEMPERATURE: 98.3 F | OXYGEN SATURATION: 100 % | HEART RATE: 80 BPM | DIASTOLIC BLOOD PRESSURE: 68 MMHG | SYSTOLIC BLOOD PRESSURE: 165 MMHG | RESPIRATION RATE: 18 BRPM

## 2018-05-30 DIAGNOSIS — S22.000A THORACIC COMPRESSION FRACTURE (HCC): Primary | ICD-10-CM

## 2018-05-30 PROCEDURE — 99283 EMERGENCY DEPT VISIT LOW MDM: CPT

## 2018-05-30 RX ORDER — DIAZEPAM 5 MG/1
5 TABLET ORAL EVERY 8 HOURS PRN
Qty: 20 TABLET | Refills: 0 | Status: SHIPPED | OUTPATIENT
Start: 2018-05-30 | End: 2018-06-21 | Stop reason: ALTCHOICE
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
## 2018-05-30 NOTE — DISCHARGE INSTRUCTIONS
Vertebral Compression Fracture   WHAT YOU NEED TO KNOW:   A vertebral compression fracture (VCF) is a break in a part of the vertebra  Vertebrae are the round, strong bones that form your spine  VCFs most often occur in the thoracic (middle) and lumbar (lower) areas of your spine  Fractures may be mild to severe  DISCHARGE INSTRUCTIONS:   Medicines: You may need any of the following:  · NSAIDs , such as ibuprofen, help decrease swelling, pain, and fever  This medicine is available with or without a doctor's order  NSAIDs can cause stomach bleeding or kidney problems in certain people  If you take blood thinner medicine, always ask if NSAIDs are safe for you  Always read the medicine label and follow directions  Do not give these medicines to children under 10months of age without direction from your child's healthcare provider  · Acetaminophen  decreases pain and fever  It is available without a doctor's order  Ask how much to take and how often to take it  Follow directions  Acetaminophen can cause liver damage if not taken correctly  · Prescription pain medicine  may be given  Ask your healthcare provider how to take this medicine safely  · Bisphosphonates and calcitonin  may be recommended to help your bones get stronger  They can decrease the pain of a VCF caused by osteoporosis, and decrease your risk for another fracture  · Take your medicine as directed  Contact your healthcare provider if you think your medicine is not helping or if you have side effects  Tell him or her if you are allergic to any medicine  Keep a list of the medicines, vitamins, and herbs you take  Include the amounts, and when and why you take them  Bring the list or the pill bottles to follow-up visits  Carry your medicine list with you in case of an emergency  Follow up with your healthcare provider as directed: You may need to return for x-rays or other tests   Write down your questions so you remember to ask them during your visits  Heat and ice:   · Apply ice  on your back for 15 to 20 minutes every hour or as directed  Use an ice pack, or put crushed ice in a plastic bag  Cover it with a towel  Ice helps prevent tissue damage and decreases swelling and pain  · Apply heat  on your back for 20 to 30 minutes every 2 hours for as many days as directed  Heat helps decrease pain and muscle spasms  Activity:   · Avoid activities that may make the pain worse, such as picking up heavy objects  When the pain decreases, begin normal, slow movements as directed by your healthcare provider  Your healthcare provider may have you do weight-bearing exercises such as walking  You may also do non-weight-bearing exercises such as swimming and bicycling  · You may need to use a walker or cane  Ask your healthcare provider for more information about how to use a cane or a walker  · When you  objects, bend at the hips and knees  Never bend from the waist only  Use bent knees and your leg muscles as you lift the object  While you lift the object, keep it close to your chest  Try not to twist or lift anything above your waist   Physical and occupational therapy:  Your healthcare provider may recommend physical and occupational therapy  A physical therapist teaches you exercises to help improve movement and strength, and to decrease pain  An occupational therapist teaches you skills to help with your daily activities  Manage pain during sleep:   · Do not sleep on a waterbed  Waterbeds do not provide good back support  · Sleep on a firm mattress  You may also put a ½ to 1-inch piece of plywood between the mattress and box spring  · Sleep on your back with a pillow under your knees  This will decrease pressure on your back  You may also sleep on your side with 1 or both of your knees bent and a pillow between them  It may also be helpful to sleep on your stomach with a pillow under you at waist level    Contact your healthcare provider if:   · You are not hungry, and you are losing weight  · You cannot sleep or rest because of back pain  · You have pain or swelling in your back that is getting worse, or does not go away  · You have questions or concerns about your condition or care  Return to the emergency department if:   · You feel lightheaded, short of breath, and have chest pain  · You cough up blood  · Your arm or leg feels warm, tender, and painful  It may look swollen and red  · You have new problems urinating or having bowel movements  · You have severe pain in your back after falling, bending forward, sneezing, or coughing strongly  · You suddenly cannot feel your legs  · You suddenly have trouble moving your arms or legs  © 2017 [address] Information is for End User's use only and may not be sold, redistributed or otherwise used for commercial purposes  All illustrations and images included in CareNotes® are the copyrighted property of [employer] M , [employer]  or [address]  The above information is an  only  It is not intended as medical advice for individual conditions or treatments  Talk to your doctor, nurse or pharmacist before following any medical regimen to see if it is safe and effective for you  Thoracolumbosacral Orthosis   AMBULATORY CARE:   A thoracolumbosacral orthosis (TLSO)  is a device used to support your spine and keep it from moving  A TLSO is made to fit from the middle of your chest to your tailbone  The TLSO provides support for your upper, middle, and lower spine at the same time  This is called a 3-point system  The 3 points are at your chest level, your ribs, and your pelvis  You may also need a piece that attaches to the top of the TLSO and fits under your chin  This prevents your head from moving  Examples of a TLSO include a clamshell brace and a thoracolumbar extension orthosis (often called a Kaz brace)    Why you may need a TLSO:  A TLSO may be needed to keep your spine stable after surgery  A TLSO may also be used instead of surgery to help correct spinal problems  An example is scoliosis (a curved spine) in adolescents  The TLSO will help your spine heal and protect it from injury  You may need to wear the TLSO for several months  Adolescents may need to wear the TLSO for a few years, until the end of puberty  Seek care immediately if:   · You have severe back pain  · You have numbness, tingling, or weakness in your legs  · You have problems urinating or having a bowel movement  Contact your healthcare provider if:   · Your back pain gets worse when you wear your brace  · Your skin is sore or raw after you wear your brace  · Your brace is damaged or broken  · You have questions or concerns about your condition or care  How to safely use a TLSO:   · Get your TLSO fitted by your healthcare provider  It is very important that your orthosis is the right size for you and that it fits properly  Your healthcare provider will help you choose a TLSO that is right for your injury or condition  You may need to get a TLSO that is custom fit for your body  · Wear your orthosis as directed  You may need to wear your orthosis during certain activities or all the time  For example, you may need to wear it during any activity that could injure your back  Check the fit of the orthosis often  If it does not fit properly or moves out of place, it could cause more injury  · Care for your orthosis  Inspect your orthosis often  Do not wear your orthosis if it is damaged or broken  Ask your healthcare provider how to care for and clean your orthosis  · Start to strengthen your lower back as directed  You may need to work with a physical therapist to strengthen your lower back by doing exercises  Ask how much physical activity is safe for you    Care for your skin:   · Always wear a clean, dry cotton shirt under your TLSO  The shirt will help absorb sweat and protect your skin  A small amount of powder may also help reduce the amount of moisture on the skin beneath the brace  · Check all areas of your skin beneath the brace every day  If you find red or irritated areas, check the position of your TLSO to make sure it is not too tight or too loose  If you have a rash, try changing your T-shirt more often  This can help if the rash is caused by heat, sweat, or laundry products  · Talk to your healthcare provider about showering  You may be able to take off the TLSO to shower  You also may be able to shower while you wear it  If you shower with the TLSO on, be sure to thoroughly dry the brace and the skin under it  Follow up with your healthcare provider as directed:  Write down your questions so you remember to ask them during your visits  © 2017 [address] [employer] Information is for End User's use only and may not be sold, redistributed or otherwise used for commercial purposes  All illustrations and images included in CareNotes® are the copyrighted property of [employer] M [employer] Inc  or [employer]  The above information is an  only  It is not intended as medical advice for individual conditions or treatments  Talk to your doctor, nurse or pharmacist before following any medical regimen to see if it is safe and effective for you

## 2018-05-31 ENCOUNTER — DOCUMENTATION (OUTPATIENT)
Dept: NEUROSURGERY | Facility: CLINIC | Age: 73
End: 2018-05-31

## 2018-05-31 ENCOUNTER — TELEPHONE (OUTPATIENT)
Dept: NEUROSURGERY | Facility: CLINIC | Age: 73
End: 2018-05-31
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
## 2018-06-18 ENCOUNTER — HOSPITAL ENCOUNTER (OUTPATIENT)
Dept: RADIOLOGY | Facility: HOSPITAL | Age: 73
Discharge: HOME | End: 2018-06-18
Payer: COMMERCIAL

## 2018-06-18 DIAGNOSIS — S12.101D CLOSED NONDISPLACED FRACTURE OF SECOND CERVICAL VERTEBRA WITH ROUTINE HEALING, UNSPECIFIED FRACTURE MORPHOLOGY, SUBSEQUENT ENCOUNTER: ICD-10-CM

## 2018-06-18 PROCEDURE — 72040 X-RAY EXAM NECK SPINE 2-3 VW: CPT

## 2018-06-21 ENCOUNTER — OFFICE VISIT (OUTPATIENT)
Dept: NEUROSURGERY | Facility: CLINIC | Age: 73
End: 2018-06-21
Payer: COMMERCIAL

## 2018-06-21 VITALS
BODY MASS INDEX: 23.66 KG/M2 | HEIGHT: 65 IN | SYSTOLIC BLOOD PRESSURE: 130 MMHG | RESPIRATION RATE: 16 BRPM | HEART RATE: 70 BPM | WEIGHT: 142 LBS | DIASTOLIC BLOOD PRESSURE: 76 MMHG | TEMPERATURE: 97.8 F

## 2018-06-21 DIAGNOSIS — S22.060D CLOSED WEDGE COMPRESSION FRACTURE OF EIGHTH THORACIC VERTEBRA WITH ROUTINE HEALING, SUBSEQUENT ENCOUNTER: ICD-10-CM

## 2018-06-21 DIAGNOSIS — S12.101D CLOSED NONDISPLACED FRACTURE OF SECOND CERVICAL VERTEBRA WITH ROUTINE HEALING, UNSPECIFIED FRACTURE MORPHOLOGY, SUBSEQUENT ENCOUNTER: Primary | ICD-10-CM

## 2018-06-21 PROCEDURE — 99213 OFFICE O/P EST LOW 20 MIN: CPT | Performed by: PHYSICIAN ASSISTANT

## 2018-06-21 RX ORDER — ALENDRONATE SODIUM 70 MG/1
70 TABLET ORAL
COMMUNITY
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
## 2018-06-21 NOTE — PATIENT INSTRUCTIONS
Continue with restricted activities as discussed, lifting no more than 10 lb, ambulation as tolerated, avoid bending  Continue with Aspen collar at all times, change pads every day or 2 and wash as directed, utilize [place] collar for shower time  May discontinue TLSO (back brace), return to this as needed for discomfort  Further follow-up with Neurosurgery is planned in approximately 3 weeks, Dr Tika Joseph  Cervical spine and thoracic spine x-rays 2-3 days prior to visit
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
## 2018-07-09 ENCOUNTER — HOSPITAL ENCOUNTER (OUTPATIENT)
Dept: RADIOLOGY | Facility: HOSPITAL | Age: 73
Discharge: HOME | End: 2018-07-09
Payer: COMMERCIAL

## 2018-07-09 DIAGNOSIS — S12.101D CLOSED NONDISPLACED FRACTURE OF SECOND CERVICAL VERTEBRA WITH ROUTINE HEALING, UNSPECIFIED FRACTURE MORPHOLOGY, SUBSEQUENT ENCOUNTER: ICD-10-CM

## 2018-07-09 DIAGNOSIS — S22.060D CLOSED WEDGE COMPRESSION FRACTURE OF EIGHTH THORACIC VERTEBRA WITH ROUTINE HEALING, SUBSEQUENT ENCOUNTER: ICD-10-CM

## 2018-07-09 PROCEDURE — 72040 X-RAY EXAM NECK SPINE 2-3 VW: CPT

## 2018-07-09 PROCEDURE — 72072 X-RAY EXAM THORAC SPINE 3VWS: CPT

## 2018-07-12 ENCOUNTER — TRANSCRIBE ORDERS (OUTPATIENT)
Dept: RADIOLOGY | Facility: HOSPITAL | Age: 73
End: 2018-07-12

## 2018-07-12 ENCOUNTER — OFFICE VISIT (OUTPATIENT)
Dept: NEUROSURGERY | Facility: CLINIC | Age: 73
End: 2018-07-12
Payer: COMMERCIAL

## 2018-07-12 ENCOUNTER — HOSPITAL ENCOUNTER (OUTPATIENT)
Dept: RADIOLOGY | Facility: HOSPITAL | Age: 73
Discharge: HOME | End: 2018-07-12
Payer: COMMERCIAL

## 2018-07-12 VITALS
TEMPERATURE: 97.8 F | SYSTOLIC BLOOD PRESSURE: 150 MMHG | WEIGHT: 145 LBS | BODY MASS INDEX: 24.16 KG/M2 | HEIGHT: 65 IN | DIASTOLIC BLOOD PRESSURE: 78 MMHG | RESPIRATION RATE: 16 BRPM | HEART RATE: 80 BPM

## 2018-07-12 DIAGNOSIS — S12.101D CLOSED NONDISPLACED FRACTURE OF SECOND CERVICAL VERTEBRA WITH ROUTINE HEALING, UNSPECIFIED FRACTURE MORPHOLOGY, SUBSEQUENT ENCOUNTER: Primary | ICD-10-CM

## 2018-07-12 DIAGNOSIS — S22.060D CLOSED WEDGE COMPRESSION FRACTURE OF EIGHTH THORACIC VERTEBRA WITH ROUTINE HEALING, SUBSEQUENT ENCOUNTER: ICD-10-CM

## 2018-07-12 DIAGNOSIS — S12.101D CLOSED NONDISPLACED FRACTURE OF SECOND CERVICAL VERTEBRA WITH ROUTINE HEALING, UNSPECIFIED FRACTURE MORPHOLOGY, SUBSEQUENT ENCOUNTER: ICD-10-CM

## 2018-07-12 PROCEDURE — 99213 OFFICE O/P EST LOW 20 MIN: CPT | Performed by: PHYSICIAN ASSISTANT

## 2018-07-12 PROCEDURE — 72040 X-RAY EXAM NECK SPINE 2-3 VW: CPT
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
## 2018-07-12 NOTE — LETTER
July 12, 2018     Lawyer Gema MD  [address]  [address]    Patient: Nicky Decker   YOB: 1945   Date of Visit: 7/12/2018       Dear Dr Anish Garces: Thank you for referring Reji May to me for evaluation  Below are my notes for this consultation  If you have questions, please do not hesitate to call me  I look forward to following your patient along with you  Sincerely,        Nava Rodgers PA-C        CC: No Recipients  Nava Rodgers PA-C  7/12/2018 10:40 AM  Sign at close encounter  Assessment/Plan:    Very pleasant 73-year-old female, returns for follow-up, history of C2 and T8 fractures  She has had updated studies cervical and thoracic spine (7/9/18) as requested  The studies were carefully reviewed in detail by Dr Hightower Later, and compared with prior studies, CT cervical spine 5/5/18, plain films of the cervical spine 5/6/18 and 6/18/18 as well as CT chest abdomen pelvis 5/5/18  Overall alignment of the cervical spine remains unchanged, and the C1-C2 relationship is appropriate/stable  Her T8 fracture has mild slow settling but is stable with appropriate alignment of the thoracic spine  She presents today with her Vista type cervical collar in place and appropriately applied, she reports she wears it all times as directed  She longer is using her TLSO brace, and reports she has no back pain regardless of activity although she did have a episode of bronchitis and had some knee generalized thoracic spine with her cough  On examination today of her cervical spine, there is full range of motion flexion, extension and rotation without pain there is no pain with palpation or percussion over the cervical spine, she does have some tenderness and/or tightness with both trapezius insertions  Motor examination the upper extremities is 5 x 5  Sensation is intact, Jeanna test is negative      She will proceed for flexion-extension films of the cervical spine following visit today, she will be contacted by phone [phone] with the results of this study  She understands she may start weaning from her cervical collar, specifically she no longer needs the collar in bed, she a is to arise in the morning and start without the collar after 2 or 3 hours when she has the sensation that her head is heavy she is to return to the collar for 2-3 hours and continue to alternate in this fashion over the next 7-10 days  A Course of physical therapy is also recommended following completion of wean from the cervical collar, and a request has been placed  She may gradually return to all usual activities over the next 7-10 day  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 vehicle 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 cervical collar, and when she feels comfortable to drive  Further follow-up with Neurosurgery will be on an as needed basis  These findings, impressions and recommendations are reviewed in great detail with the patient, she expressed understanding and agreement, her questions were answered completely and to her satisfaction  Diagnoses and all orders for this visit:    Closed nondisplaced fracture of second cervical vertebra with routine healing, unspecified fracture morphology, subsequent encounter  -     XR spine cervical 2 or 3 vw injury; Future  -     Ambulatory referral to Physical Therapy; Future    Closed wedge compression fracture of eighth thoracic vertebra with routine healing, subsequent encounter  -     Ambulatory referral to Physical Therapy; Future          No Follow-up on file  Subjective:      Patient ID: Yasmani Quiles is a 71 y o  female  HPI    The following portions of the patient's history were reviewed and updated as appropriate: {history reviewed:20406}  Review of Systems   Constitutional: Negative  HENT: Negative  Eyes: Negative  Respiratory: Negative  Cardiovascular: Negative  Gastrointestinal: Negative  Endocrine: Negative  Genitourinary: Negative  Musculoskeletal: Negative  Skin: Negative  Allergic/Immunologic: Negative  Neurological: Positive for dizziness  Hematological: Negative  Objective:    Physical Exam   Constitutional: She is oriented to person, place, and time  She appears well-developed and well-nourished  HENT:   Head: Normocephalic and atraumatic  Cardiovascular: Normal rate, regular rhythm and normal heart sounds  Pulmonary/Chest: Effort normal and breath sounds normal    Musculoskeletal:   Back examination (thoracic spine);    full range of motion, flexion, and extension    There is no tenderness or pain with palpation and or percussion over the thoracic spine  Neck examination;    Mild tenderness to palpation over the trapezius bilaterally, no pain with palpation or percussion over the cervical spine  Full range of motion of the cervical spine without pain  Neurological: She is alert and oriented to person, place, and time  She has normal reflexes  Skin: Skin is warm and dry  Psychiatric: She has a normal mood and affect  Neurologic Exam     Mental Status   Oriented to person, place, and time
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
## 2018-07-12 NOTE — PATIENT INSTRUCTIONS
Proceed for flexion-extension cervical spine study today  As discussed start to wean from the cervical collar over the next 7-10 days, the collar is no longer needed bedtime, start today without the collar after 2-3 hours when there is a sensation of head heaviness return to the collar for 2-3 hours and continue to alternate in this fashion over the next 7-10 days  After wean from the collar a course of physical therapy is advised, and request has been placed  As discussed gradually increase her activities 2 year baseline over the next 7-10 days as well  Further follow-up with Neurosurgery will be on an as needed basis  Should you develop any new symptoms, increased pain or other changes please return for reassessment

## 2018-07-24 ENCOUNTER — EVALUATION (OUTPATIENT)
Dept: PHYSICAL THERAPY | Facility: CLINIC | Age: 73
End: 2018-07-24
Payer: COMMERCIAL

## 2018-07-24 DIAGNOSIS — S22.060D CLOSED WEDGE COMPRESSION FRACTURE OF EIGHTH THORACIC VERTEBRA WITH ROUTINE HEALING, SUBSEQUENT ENCOUNTER: ICD-10-CM

## 2018-07-24 DIAGNOSIS — S12.101D CLOSED NONDISPLACED FRACTURE OF SECOND CERVICAL VERTEBRA WITH ROUTINE HEALING, UNSPECIFIED FRACTURE MORPHOLOGY, SUBSEQUENT ENCOUNTER: Primary | ICD-10-CM

## 2018-07-24 PROCEDURE — G8984 CARRY CURRENT STATUS: HCPCS | Performed by: PHYSICAL THERAPIST

## 2018-07-24 PROCEDURE — G8985 CARRY GOAL STATUS: HCPCS | Performed by: PHYSICAL THERAPIST

## 2018-07-24 PROCEDURE — 97140 MANUAL THERAPY 1/> REGIONS: CPT | Performed by: PHYSICAL THERAPIST

## 2018-07-24 PROCEDURE — 97162 PT EVAL MOD COMPLEX 30 MIN: CPT | Performed by: PHYSICAL THERAPIST

## 2018-07-24 PROCEDURE — 97110 THERAPEUTIC EXERCISES: CPT | Performed by: PHYSICAL THERAPIST
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
## 2018-07-27 ENCOUNTER — OFFICE VISIT (OUTPATIENT)
Dept: PHYSICAL THERAPY | Facility: CLINIC | Age: 73
End: 2018-07-27
Payer: COMMERCIAL

## 2018-07-27 DIAGNOSIS — S12.101D CLOSED NONDISPLACED FRACTURE OF SECOND CERVICAL VERTEBRA WITH ROUTINE HEALING, UNSPECIFIED FRACTURE MORPHOLOGY, SUBSEQUENT ENCOUNTER: Primary | ICD-10-CM

## 2018-07-27 PROCEDURE — 97110 THERAPEUTIC EXERCISES: CPT

## 2018-07-27 PROCEDURE — 97140 MANUAL THERAPY 1/> REGIONS: CPT

## 2018-07-27 NOTE — PROGRESS NOTES
Daily Note     Today's date: 2018  Patient name: Alfredo Robles  : 1945  MRN: [id number]  Referring provider: Britney Bates PA-C  Dx:   Encounter Diagnosis     ICD-10-CM    1  Closed nondisplaced fracture of second cervical vertebra with routine healing, unspecified fracture morphology, subsequent encounter S12 101D                   Subjective: pt denies any pain upon arrival today  She states that she is more stiff than having any pain  Objective: See treatment diary below  Precautions Anxiety, Non-Hodgkins Lymphoma     Specialty Daily Treatment Diary      Manual           UT stretch esperanza     SA         PA mobs C2-7 JF           Uni mobs C2-7 JF           SB mobs C2-7 JF                               Exercise Diary           UBE    5'f/5'b standing         Webslide rows H,M,L    orange 20x         Bkwd shoulder rolls    30x         Scapular retractions    5" 30x         AROM c-spine Instructed  15x         UT stretch esperanza  Instructed  30" 3x                                                                                                                                                                                                                   Modalities                                                              Assessment: Pt had no pain throughout nor post treatment today  She noted feeling better and more loose than when she had arrived  She was educated on HEP including bilateral UT to improve ROM outside of therapy  Plan: Progress treatment as tolerated

## 2018-07-30 ENCOUNTER — OFFICE VISIT (OUTPATIENT)
Dept: PHYSICAL THERAPY | Facility: CLINIC | Age: 73
End: 2018-07-30
Payer: COMMERCIAL

## 2018-07-30 DIAGNOSIS — S12.101D CLOSED NONDISPLACED FRACTURE OF SECOND CERVICAL VERTEBRA WITH ROUTINE HEALING, UNSPECIFIED FRACTURE MORPHOLOGY, SUBSEQUENT ENCOUNTER: Primary | ICD-10-CM

## 2018-07-30 PROCEDURE — 97140 MANUAL THERAPY 1/> REGIONS: CPT

## 2018-07-30 PROCEDURE — 97110 THERAPEUTIC EXERCISES: CPT

## 2018-07-30 PROCEDURE — 97112 NEUROMUSCULAR REEDUCATION: CPT

## 2018-07-30 NOTE — PROGRESS NOTES
Daily Note     Today's date: 2018  Patient name: Marietta Bell  : 1945  MRN: [id number]  Referring provider: Santana Tejada PA-C  Dx:   Encounter Diagnosis     ICD-10-CM    1  Closed nondisplaced fracture of second cervical vertebra with routine healing, unspecified fracture morphology, subsequent encounter S12 101D                   Subjective: Pt denies any pain upon arrival today  She states that she feels as if her mobility is improving  Objective: See treatment diary below  Precautions Anxiety, Non-Hodgkins Lymphoma     Specialty Daily Treatment Diary      Manual         UT stretch esperanza     SA  SA       PA mobs C2-7 JF           Uni mobs C2-7 JF           SB mobs C2-7 JF                               Exercise Diary         UBE    5'f/5'b standing  5'f/5'b standing       Webslide rows H,M,L    orange 20x  GTB 20x       Bkwd shoulder rolls    30x  30x       Scapular retractions    5" 30x  5" 30x       AROM c-spine Instructed  15x  15x       UT stretch esperanza  Instructed  30" 3x  30" 3x                                                                                                                                                                                                                 Modalities                                                             Assessment: Pt had no pain throughout nor post treatment today  She noted feeling as if her ROM is improving and it is  She was educated on HEP including cervical AROM and UT stretches  Pt expresses interest in fitness program upon DC  Plan: Progress treatment as tolerated

## 2018-08-01 ENCOUNTER — OFFICE VISIT (OUTPATIENT)
Dept: PHYSICAL THERAPY | Facility: CLINIC | Age: 73
End: 2018-08-01
Payer: COMMERCIAL

## 2018-08-01 DIAGNOSIS — S12.101D CLOSED NONDISPLACED FRACTURE OF SECOND CERVICAL VERTEBRA WITH ROUTINE HEALING, UNSPECIFIED FRACTURE MORPHOLOGY, SUBSEQUENT ENCOUNTER: Primary | ICD-10-CM

## 2018-08-01 PROCEDURE — 97110 THERAPEUTIC EXERCISES: CPT

## 2018-08-01 PROCEDURE — 97112 NEUROMUSCULAR REEDUCATION: CPT

## 2018-08-01 PROCEDURE — 97140 MANUAL THERAPY 1/> REGIONS: CPT

## 2018-08-01 NOTE — PROGRESS NOTES
Daily Note     Today's date: 2018  Patient name: Elizabeth Newell  : 1945  MRN: [id number]  Referring provider: Jakob Lucas PA-C  Dx:   Encounter Diagnosis     ICD-10-CM    1  Closed nondisplaced fracture of second cervical vertebra with routine healing, unspecified fracture morphology, subsequent encounter S12 101D                   Subjective: Pt denies any pain upon arrival today  She states that she feels some ms soreness today but no pain  Objective: See treatment diary below  Precautions Anxiety, Non-Hodgkins Lymphoma     Specialty Daily Treatment Diary      Manual       UT stretch esperanza     SA  SA  SA     PA mobs C2-7 JF           Uni mobs C2-7 JF           SB mobs C2-7 JF                               Exercise Diary       UBE    5'f/5'b standing  5'f/5'b standing  5'f/5'b standing     Webslide rows H,M,L    orange 20x  GTB 20x  BTB 20x     Bkwd shoulder rolls    30x  30x  30x     Scapular retractions    5" 30x  5" 30x  5" 30x     AROM c-spine Instructed  15x  15x  15x     UT stretch esperanza  Instructed  30" 3x  30" 3x  30" 3x                                                                                                                                                                                                               Modalities                                                             Assessment: Pt had no pain throughout nor post treatment today  She noted feeling as if her neck is improving and she is anxious to get into the fitness program  She was educated on HEP to decrease cervical pain and tension outside of therapy  Plan: Progress treatment as tolerated

## 2018-08-03 ENCOUNTER — OFFICE VISIT (OUTPATIENT)
Dept: PHYSICAL THERAPY | Facility: CLINIC | Age: 73
End: 2018-08-03
Payer: COMMERCIAL

## 2018-08-03 DIAGNOSIS — S12.101D CLOSED NONDISPLACED FRACTURE OF SECOND CERVICAL VERTEBRA WITH ROUTINE HEALING, UNSPECIFIED FRACTURE MORPHOLOGY, SUBSEQUENT ENCOUNTER: Primary | ICD-10-CM

## 2018-08-03 PROCEDURE — 97110 THERAPEUTIC EXERCISES: CPT

## 2018-08-03 PROCEDURE — 97140 MANUAL THERAPY 1/> REGIONS: CPT

## 2018-08-03 PROCEDURE — 97112 NEUROMUSCULAR REEDUCATION: CPT

## 2018-08-03 NOTE — PROGRESS NOTES
Daily Note     Today's date: 8/3/2018  Patient name: Kasia Boykin  : 1945  MRN: [id number]  Referring provider: Kaylynn Feliciano PA-C  Dx:   Encounter Diagnosis     ICD-10-CM    1  Closed nondisplaced fracture of second cervical vertebra with routine healing, unspecified fracture morphology, subsequent encounter S12 101D                   Subjective: Pt denies any pain upon arrival today  She states that she feels as if her ROM is greatly improving  Objective: See treatment diary below  Precautions Anxiety, Non-Hodgkins Lymphoma     Specialty Daily Treatment Diary      Manual  7/24  7/27  7/30  8/1  8/3   UT stretch esperanza     SA  SA  SA  SA   PA mobs C2-7 JF           Uni mobs C2-7 JF           SB mobs C2-7 JF                               Exercise Diary  7/24  7/27  7/30  8/1  8/3   UBE    5'f/5'b standing  5'f/5'b standing  5'f/5'b standing  5'f/5'b   Webslide rows H,M,L    orange 20x  GTB 20x  BTB 20x  BTB 20x   Bkwd shoulder rolls    30x  30x  30x  30x   Scapular retractions    5" 30x  5" 30x  5" 30x  5" 30x   AROM c-spine Instructed  15x  15x  15x  15x   UT stretch esperanza  Instructed  30" 3x  30" 3x  30" 3x  30" 3x                                                                                                                                                                                                             Modalities                                                             Assessment: Pt had no pain throughout nor post treatment today  She noted always feeling better leaving therapy than when arriving  She was educated on HEP including AROM to continue to improve ROM  She will benefit from continued skilled PT to improve cervical ROM  Plan: Progress treatment as tolerated

## 2018-08-06 ENCOUNTER — APPOINTMENT (OUTPATIENT)
Dept: PHYSICAL THERAPY | Facility: CLINIC | Age: 73
End: 2018-08-06
Payer: COMMERCIAL

## 2018-08-07 ENCOUNTER — APPOINTMENT (OUTPATIENT)
Dept: PHYSICAL THERAPY | Facility: CLINIC | Age: 73
End: 2018-08-07
Payer: COMMERCIAL

## 2018-08-08 ENCOUNTER — APPOINTMENT (OUTPATIENT)
Dept: PHYSICAL THERAPY | Facility: CLINIC | Age: 73
End: 2018-08-08
Payer: COMMERCIAL

## 2018-08-09 ENCOUNTER — APPOINTMENT (OUTPATIENT)
Dept: PHYSICAL THERAPY | Facility: CLINIC | Age: 73
End: 2018-08-09
Payer: COMMERCIAL

## 2018-08-10 ENCOUNTER — OFFICE VISIT (OUTPATIENT)
Dept: PHYSICAL THERAPY | Facility: CLINIC | Age: 73
End: 2018-08-10
Payer: COMMERCIAL

## 2018-08-10 DIAGNOSIS — S12.101D CLOSED NONDISPLACED FRACTURE OF SECOND CERVICAL VERTEBRA WITH ROUTINE HEALING, UNSPECIFIED FRACTURE MORPHOLOGY, SUBSEQUENT ENCOUNTER: Primary | ICD-10-CM

## 2018-08-10 PROCEDURE — 97112 NEUROMUSCULAR REEDUCATION: CPT | Performed by: PHYSICAL THERAPIST

## 2018-08-10 PROCEDURE — 97140 MANUAL THERAPY 1/> REGIONS: CPT | Performed by: PHYSICAL THERAPIST

## 2018-08-10 PROCEDURE — 97110 THERAPEUTIC EXERCISES: CPT | Performed by: PHYSICAL THERAPIST

## 2018-08-10 NOTE — PROGRESS NOTES
Daily Note     Today's date: 8/10/2018  Patient name: Deyanira Jimenez  : 1945  MRN: [id number]  Referring provider: José Miguel Quiñonez PA-C  Dx:   Encounter Diagnosis     ICD-10-CM    1  Closed nondisplaced fracture of second cervical vertebra with routine healing, unspecified fracture morphology, subsequent encounter S12 101D                   Subjective: Patient is not having any neck pain  Cervical ROM is improving, but still not where it needs to be  Objective: See treatment diary below  Precautions Anxiety, Non-Hodgkins Lymphoma     Specialty Daily Treatment Diary      Manual  8/10  7/27  7/30  8/1  8/3   UT stretch esperanza   JF  SA  SA  SA  SA   PA mobs C2-7 JF           Uni mobs C2-7 JF           SB mobs C2-7 JF                               Exercise Diary  8/10  7/27  7/30  8/1  8/3   UBE  x10'  5'f/5'b standing  5'f/5'b standing  5'f/5'b standing  5'f/5'b   Webslide rows H,M,L  BTB x20  orange 20x  GTB 20x  BTB 20x  BTB 20x   Bkwd shoulder rolls  x30  30x  30x  30x  30x   Scapular retractions  5"x30  5" 30x  5" 30x  5" 30x  5" 30x   AROM c-spine Ix15  15x  15x  15x  15x   UT stretch esperanza  3x30"  30" 3x  30" 3x  30" 3x  30" 3x                                                                                                                                                                                                             Modalities                                                              Assessment: Tolerated treatment well  Patient would benefit from continued PT   ROM improving  No pain after session  Felt good after manual techniques      Plan: Continue per plan of care

## 2018-08-13 ENCOUNTER — OFFICE VISIT (OUTPATIENT)
Dept: PHYSICAL THERAPY | Facility: CLINIC | Age: 73
End: 2018-08-13
Payer: COMMERCIAL

## 2018-08-13 DIAGNOSIS — S12.101D CLOSED NONDISPLACED FRACTURE OF SECOND CERVICAL VERTEBRA WITH ROUTINE HEALING, UNSPECIFIED FRACTURE MORPHOLOGY, SUBSEQUENT ENCOUNTER: Primary | ICD-10-CM

## 2018-08-13 DIAGNOSIS — S22.060D CLOSED WEDGE COMPRESSION FRACTURE OF EIGHTH THORACIC VERTEBRA WITH ROUTINE HEALING, SUBSEQUENT ENCOUNTER: ICD-10-CM

## 2018-08-13 PROCEDURE — 97112 NEUROMUSCULAR REEDUCATION: CPT

## 2018-08-13 PROCEDURE — 97140 MANUAL THERAPY 1/> REGIONS: CPT

## 2018-08-13 PROCEDURE — 97110 THERAPEUTIC EXERCISES: CPT

## 2018-08-13 NOTE — PROGRESS NOTES
Daily Note     Today's date: 2018  Patient name: Flori Perez  : 1945  MRN: [id number]  Referring provider: David Ruiz PA-C  Dx:   Encounter Diagnosis     ICD-10-CM    1  Closed nondisplaced fracture of second cervical vertebra with routine healing, unspecified fracture morphology, subsequent encounter S12 101D                   Subjective: Pt denies any pain upon arrival today  She states that her neck has been feeling pretty good and she would like to do fitness program        Objective: See treatment diary below  Precautions Anxiety, Non-Hodgkins Lymphoma     Specialty Daily Treatment Diary      Manual  8/10  8/13  7/30  8/1  8/3   UT stretch esperanza   JF  SA  SA  SA  SA   PA mobs C2-7 JF           Uni mobs C2-7 JF           SB mobs C2-7 JF                               Exercise Diary  8/10  8/13  7/30  8/1  8/3   UBE  x10'  5'f/5'b standing  5'f/5'b standing  5'f/5'b standing  5'f/5'b   Webslide rows H,M,L  BTB x20  purple 20x  GTB 20x  BTB 20x  BTB 20x   Bkwd shoulder rolls  x30  30x  30x  30x  30x   Scapular retractions  5"x30  5" 30x  5" 30x  5" 30x  5" 30x   AROM c-spine Ix15  15x  15x  15x  15x   UT stretch esperanza  3x30"  30" 3x  30" 3x  30" 3x  30" 3x                                                                                                                                                                                                             Modalities                                                              Assessment: Pt had no pain throughout nor post treatment today  She noted feeling as if the Hanny have really helped to decrease her neck pain  She notes a desire to DC to the fitness program  Pt may DC to fitness program next week  Plan: Progress treatment as tolerated

## 2018-08-15 ENCOUNTER — OFFICE VISIT (OUTPATIENT)
Dept: PHYSICAL THERAPY | Facility: CLINIC | Age: 73
End: 2018-08-15
Payer: COMMERCIAL

## 2018-08-15 DIAGNOSIS — S12.101D CLOSED NONDISPLACED FRACTURE OF SECOND CERVICAL VERTEBRA WITH ROUTINE HEALING, UNSPECIFIED FRACTURE MORPHOLOGY, SUBSEQUENT ENCOUNTER: Primary | ICD-10-CM

## 2018-08-15 PROCEDURE — 97140 MANUAL THERAPY 1/> REGIONS: CPT

## 2018-08-15 PROCEDURE — 97110 THERAPEUTIC EXERCISES: CPT

## 2018-08-15 PROCEDURE — 97112 NEUROMUSCULAR REEDUCATION: CPT

## 2018-08-15 NOTE — PROGRESS NOTES
Daily Note     Today's date: 8/15/2018  Patient name: Alfredo Robles  : 1945  MRN: [id number]  Referring provider: Britney Bates PA-C  Dx:   Encounter Diagnosis     ICD-10-CM    1  Closed nondisplaced fracture of second cervical vertebra with routine healing, unspecified fracture morphology, subsequent encounter S12 101D                   Subjective: Pt denies any pain upon arrival today  She states that she is stiff today  Objective: See treatment diary below  Precautions Anxiety, Non-Hodgkins Lymphoma     Specialty Daily Treatment Diary      Manual  8/10  8/13  8/15  8/1  8/3   UT stretch esperanza   JF  SA  SA  SA  SA   PA mobs C2-7 JF           Uni mobs C2-7 JF           SB mobs C2-7 JF                               Exercise Diary  8/10  8/13  8/15  8/1  8/3   UBE  x10'  5'f/5'b standing  5'f/5'b standing  5'f/5'b standing  5'f/5'b   Webslide rows H,M,L  BTB x20  purple 20x  purple 20x  BTB 20x  BTB 20x   Bkwd shoulder rolls  x30  30x  30x  30x  30x   Scapular retractions  5"x30  5" 30x  5" 30x  5" 30x  5" 30x   AROM c-spine Ix15  15x  15x  15x  15x   UT stretch esperanza  3x30"  30" 3x  30" 3x  30" 3x  30" 3x                                                                                                                                                                                                             Modalities                                                             Assessment: Pt had no pain throughout nor post treatment today  She noted feeling less tension in cervical region post AROM  She may DC to [facility] next week  Plan: Progress treatment as tolerated

## 2018-08-17 ENCOUNTER — APPOINTMENT (OUTPATIENT)
Dept: PHYSICAL THERAPY | Facility: CLINIC | Age: 73
End: 2018-08-17
Payer: COMMERCIAL

## 2018-08-20 ENCOUNTER — APPOINTMENT (OUTPATIENT)
Dept: PHYSICAL THERAPY | Facility: CLINIC | Age: 73
End: 2018-08-20
Payer: COMMERCIAL

## 2018-08-22 ENCOUNTER — APPOINTMENT (OUTPATIENT)
Dept: PHYSICAL THERAPY | Facility: CLINIC | Age: 73
End: 2018-08-22
Payer: COMMERCIAL

## 2018-08-23 ENCOUNTER — OFFICE VISIT (OUTPATIENT)
Dept: PHYSICAL THERAPY | Facility: CLINIC | Age: 73
End: 2018-08-23
Payer: COMMERCIAL

## 2018-08-23 DIAGNOSIS — S22.060D CLOSED WEDGE COMPRESSION FRACTURE OF EIGHTH THORACIC VERTEBRA WITH ROUTINE HEALING, SUBSEQUENT ENCOUNTER: ICD-10-CM

## 2018-08-23 DIAGNOSIS — S12.101D CLOSED NONDISPLACED FRACTURE OF SECOND CERVICAL VERTEBRA WITH ROUTINE HEALING, UNSPECIFIED FRACTURE MORPHOLOGY, SUBSEQUENT ENCOUNTER: Primary | ICD-10-CM

## 2018-08-23 PROCEDURE — 97112 NEUROMUSCULAR REEDUCATION: CPT

## 2018-08-23 PROCEDURE — 97140 MANUAL THERAPY 1/> REGIONS: CPT

## 2018-08-23 PROCEDURE — 97110 THERAPEUTIC EXERCISES: CPT

## 2018-08-23 NOTE — PROGRESS NOTES
Daily Note     Today's date: 2018  Patient name: Brittani Morgan  : 1945  MRN: [id number]  Referring provider: Sang Tong PA-C  Dx:   Encounter Diagnosis     ICD-10-CM    1  Closed nondisplaced fracture of second cervical vertebra with routine healing, unspecified fracture morphology, subsequent encounter S12 101D    2  Closed wedge compression fracture of eighth thoracic vertebra with routine healing, subsequent encounter S22 060D                   Subjective: Pt denies any pain upon arrival today  She states that she feels as if she is better and wants to DC to fitness program        Objective: See treatment diary below  Precautions Anxiety, Non-Hodgkins Lymphoma     Specialty Daily Treatment Diary      Manual  8/10  8/13  8/15  8/23  8/3   UT stretch esperanza   JF  SA  SA  SA  SA   PA mobs C2-7 JF           Uni mobs C2-7 JF           SB mobs C2-7 JF                               Exercise Diary  8/10  8/13  8/15  8/23  8/3   UBE  x10'  5'f/5'b standing  5'f/5'b standing  5'f/5'b standing  5'f/5'b   Webslide rows H,M,L  BTB x20  purple 20x  purple 20x purple 20x  BTB 20x   Bkwd shoulder rolls  x30  30x  30x  30x  30x   Scapular retractions  5"x30  5" 30x  5" 30x  5" 30x  5" 30x   AROM c-spine Ix15  15x  15x  15x  15x   UT stretch esperanza  3x30"  30" 3x  30" 3x  30" 3x  30" 3x                                                                                                                                                                                                             Modalities                                                              Assessment: Pt had no pain throughout nor post treatment today  She noted feeling as if her neck and back have greatly improved as she is ready to DC to [facility]  Pt will DC to [facility] tomorrow  Plan: Potential discharge next visit

## 2018-08-24 ENCOUNTER — EVALUATION (OUTPATIENT)
Dept: PHYSICAL THERAPY | Facility: CLINIC | Age: 73
End: 2018-08-24
Payer: COMMERCIAL

## 2018-08-24 DIAGNOSIS — S22.060D CLOSED WEDGE COMPRESSION FRACTURE OF EIGHTH THORACIC VERTEBRA WITH ROUTINE HEALING, SUBSEQUENT ENCOUNTER: ICD-10-CM

## 2018-08-24 DIAGNOSIS — S12.101D CLOSED NONDISPLACED FRACTURE OF SECOND CERVICAL VERTEBRA WITH ROUTINE HEALING, UNSPECIFIED FRACTURE MORPHOLOGY, SUBSEQUENT ENCOUNTER: Primary | ICD-10-CM

## 2018-08-24 PROCEDURE — G8985 CARRY GOAL STATUS: HCPCS | Performed by: PHYSICAL THERAPIST

## 2018-08-24 PROCEDURE — G8986 CARRY D/C STATUS: HCPCS | Performed by: PHYSICAL THERAPIST

## 2018-08-24 PROCEDURE — 97112 NEUROMUSCULAR REEDUCATION: CPT | Performed by: PHYSICAL THERAPIST

## 2018-08-24 PROCEDURE — 97110 THERAPEUTIC EXERCISES: CPT | Performed by: PHYSICAL THERAPIST

## 2018-08-24 NOTE — PROGRESS NOTES
PT Discharge    Today's date: 2018  Patient name: Yasmani Quiles  : 1945  MRN: [id number]  Referring provider: Roge Simpson PA-C  Dx:   Encounter Diagnosis     ICD-10-CM    1  Closed nondisplaced fracture of second cervical vertebra with routine healing, unspecified fracture morphology, subsequent encounter S12 101D    2  Closed wedge compression fracture of eighth thoracic vertebra with routine healing, subsequent encounter S22 060D                   Assessment  Impairments: abnormal or restricted ROM, activity intolerance and pain with function  Functional limitations: Difficulty looking over shoulder when driving  Assessment details: Patient demonstrates greatly improved cervical ROM  Denies pain or functional rotations  She demonstrates improved postural awareness and requests DC of PT services at this time  Understanding of Dx/Px/POC: good   Prognosis: good    Goals  STG (4 weeks)  1  Patient will demonstrate 50% improvement in cervical ROM - met  2  Patient will report pain as a 3/10 at worst with activity - met  LTG (8 weeks)  1  Patient will report pain as a 0-1/10 at worst with looking over her shoulder while driving - met  2  Patient will demonstrate cervical ROM to [facility] in order to look over her shoulder while driving  - met    Plan  Patient would benefit from: skilled physical therapy  Planned therapy interventions: home exercise program and patient education  Plan of Care beginning date: 2018  Plan of Care expiration date: 2018  Treatment plan discussed with: patient        Subjective Evaluation    History of Present Illness  Date of onset: 2018  Mechanism of injury: Patient feels 90% improved since starting physical therapy  She is no longer having pain and notices greatly improved ROM    Is independent with a [facility] and requests DC of PT services at this time  Not a recurrent problem   Quality of life: good    Pain  Current pain ratin  At best pain ratin  At worst pain ratin  Location: Entire neck  Progression: improved    Social Support    Employment status: not working  Hand dominance: right      Diagnostic Tests  X-ray: normal  Treatments  No previous or current treatments  Patient Goals  Patient goals for therapy: increased motion and decreased pain          Objective     Postural Observations  Seated posture: good  Standing posture: good        Tenderness     Additional Tenderness Details  Tenderness C2-7    Neurological Testing     Sensation   Cervical/Thoracic   Left   Intact: light touch    Right   Intact: light touch    Active Range of Motion   Cervical/Thoracic Spine   Cervical    Flexion: 57 degrees   Extension: 60 degrees   Left lateral flexion: 35 degrees   Right lateral flexion: 36 degrees   Left rotation: 70 degrees   Right rotation: 80 degrees     Strength/Myotome Testing   Cervical Spine     Left   Normal strength    Right etr  Normal strength          Precautions Anxiety, Non-Hodgkins Lymphoma     Specialty Daily Treatment Diary      Manual  8/10  8/13  8/15  8/23  8/3   UT stretch esperanza   JF  SA  SA  SA  SA   PA mobs C2-7 JF           Uni mobs C2-7 JF           SB mobs C2-7 JF                               Exercise Diary  8/10  8/13  8/15  8/23  8/24   UBE  x10'  5'f/5'b standing  5'f/5'b standing  5'f/5'b standing  5'f/5'b   Webslide rows H,M,L  BTB x20  purple 20x  purple 20x purple 20x  BTB 20x   Bkwd shoulder rolls  x30  30x  30x  30x  30x   Scapular retractions  5"x30  5" 30x  5" 30x  5" 30x  5" 30x   AROM c-spine Ix15  15x  15x  15x  15x   UT stretch esperanza   3x30"  30" 3x  30" 3x  30" 3x  30" 3x                                                                                                                                                                                                             Modalities

## 2019-03-05 ENCOUNTER — APPOINTMENT (OUTPATIENT)
Dept: RADIOLOGY | Facility: MEDICAL CENTER | Age: 74
End: 2019-03-05
Payer: COMMERCIAL

## 2019-03-05 ENCOUNTER — OFFICE VISIT (OUTPATIENT)
Dept: OBGYN CLINIC | Facility: MEDICAL CENTER | Age: 74
End: 2019-03-05
Payer: COMMERCIAL

## 2019-03-05 VITALS
HEIGHT: 65 IN | SYSTOLIC BLOOD PRESSURE: 136 MMHG | HEART RATE: 72 BPM | WEIGHT: 148.6 LBS | DIASTOLIC BLOOD PRESSURE: 76 MMHG | BODY MASS INDEX: 24.76 KG/M2

## 2019-03-05 DIAGNOSIS — M79.671 PAIN IN RIGHT FOOT: ICD-10-CM

## 2019-03-05 DIAGNOSIS — S92.514A CLOSED NONDISPLACED FRACTURE OF PROXIMAL PHALANX OF LESSER TOE OF RIGHT FOOT, INITIAL ENCOUNTER: Primary | ICD-10-CM

## 2019-03-05 PROCEDURE — 99214 OFFICE O/P EST MOD 30 MIN: CPT | Performed by: EMERGENCY MEDICINE

## 2019-03-05 PROCEDURE — 73630 X-RAY EXAM OF FOOT: CPT

## 2019-03-05 RX ORDER — ZOLPIDEM TARTRATE 5 MG/1
1 TABLET ORAL DAILY
COMMUNITY
Start: 2018-07-30
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
## 2019-03-05 NOTE — PATIENT INSTRUCTIONS
Continue Advil as needed for pain swelling and inflammation  Recommend icing the foot for 10-15 minutes at a time 1-2 times per day  Recommend using a stiff soled shoe may also buddy-taped toes for support  Should noticed some improvement around the 2 week ruth post injury after which he may wean back into normal activity  Do expect this to take 4-6 weeks to heal properly    Follow-up as needed call for any questions or concerns

## 2019-05-18 ENCOUNTER — OFFICE VISIT (OUTPATIENT)
Dept: URGENT CARE | Facility: CLINIC | Age: 74
End: 2019-05-18
Payer: COMMERCIAL

## 2019-05-18 VITALS
RESPIRATION RATE: 18 BRPM | DIASTOLIC BLOOD PRESSURE: 58 MMHG | HEART RATE: 87 BPM | HEIGHT: 65 IN | WEIGHT: 147 LBS | BODY MASS INDEX: 24.49 KG/M2 | OXYGEN SATURATION: 97 % | SYSTOLIC BLOOD PRESSURE: 128 MMHG | TEMPERATURE: 97.9 F

## 2019-05-18 DIAGNOSIS — R05.9 COUGH: Primary | ICD-10-CM

## 2019-05-18 PROCEDURE — S9083 URGENT CARE CENTER GLOBAL: HCPCS | Performed by: PHYSICIAN ASSISTANT

## 2019-05-18 PROCEDURE — 99213 OFFICE O/P EST LOW 20 MIN: CPT | Performed by: PHYSICIAN ASSISTANT

## 2019-05-18 RX ORDER — AZITHROMYCIN 250 MG/1
TABLET, FILM COATED ORAL
Qty: 6 TABLET | Refills: 0 | Status: SHIPPED | OUTPATIENT
Start: 2019-05-18 | End: 2019-05-22

## 2019-05-18 RX ORDER — BENZONATATE 100 MG/1
100 CAPSULE ORAL 3 TIMES DAILY PRN
Qty: 30 CAPSULE | Refills: 0 | Status: SHIPPED | OUTPATIENT
Start: 2019-05-18 | End: 2019-05-18 | Stop reason: ALTCHOICE

## 2019-05-18 RX ORDER — ALBUTEROL SULFATE 90 UG/1
2 AEROSOL, METERED RESPIRATORY (INHALATION) 4 TIMES DAILY
Qty: 8 G | Refills: 0 | Status: SHIPPED | OUTPATIENT
Start: 2019-05-18

## 2019-06-17 ENCOUNTER — OFFICE VISIT (OUTPATIENT)
Dept: URGENT CARE | Facility: CLINIC | Age: 74
End: 2019-06-17
Payer: COMMERCIAL

## 2019-06-17 VITALS
OXYGEN SATURATION: 97 % | DIASTOLIC BLOOD PRESSURE: 62 MMHG | SYSTOLIC BLOOD PRESSURE: 124 MMHG | HEIGHT: 65 IN | RESPIRATION RATE: 18 BRPM | HEART RATE: 87 BPM | WEIGHT: 149 LBS | BODY MASS INDEX: 24.83 KG/M2 | TEMPERATURE: 98.7 F

## 2019-06-17 DIAGNOSIS — S61.002A OPEN WOUND OF LEFT THUMB, INITIAL ENCOUNTER: Primary | ICD-10-CM

## 2019-06-17 PROCEDURE — 99213 OFFICE O/P EST LOW 20 MIN: CPT | Performed by: PHYSICIAN ASSISTANT

## 2019-06-17 PROCEDURE — S9083 URGENT CARE CENTER GLOBAL: HCPCS | Performed by: PHYSICIAN ASSISTANT

## 2019-06-17 RX ORDER — CEPHALEXIN 500 MG/1
500 CAPSULE ORAL EVERY 8 HOURS SCHEDULED
Qty: 15 CAPSULE | Refills: 0 | Status: SHIPPED | OUTPATIENT
Start: 2019-06-17 | End: 2019-06-22

## 2019-08-13 NOTE — TELEPHONE ENCOUNTER
Problem: Pain:  Goal: Pain level will decrease  Description  Pain level will decrease  Outcome: Ongoing  Goal: Control of acute pain  Description  Control of acute pain  Outcome: Ongoing  Goal: Control of chronic pain  Description  Control of chronic pain  Outcome: Ongoing     Problem: Falls - Risk of:  Goal: Will remain free from falls  Description  Will remain free from falls  Outcome: Ongoing  Goal: Absence of physical injury  Description  Absence of physical injury  Outcome: Ongoing     Problem: Discharge Planning:  Goal: Discharged to appropriate level of care  Description  Discharged to appropriate level of care  Outcome: Ongoing  Goal: Participates in care planning  Description  Participates in care planning  Outcome: Ongoing     Problem: Airway Clearance - Ineffective:  Goal: Clear lung sounds  Description  Clear lung sounds  Outcome: Ongoing  Goal: Ability to maintain a clear airway will improve  Description  Ability to maintain a clear airway will improve  Outcome: Ongoing     Problem: Fluid Volume - Deficit:  Goal: Achieves intake and output within specified parameters  Description  Achieves intake and output within specified parameters  Outcome: Ongoing     Problem: Gas Exchange - Impaired:  Goal: Levels of oxygenation will improve  Description  Levels of oxygenation will improve  Outcome: Ongoing     Problem: Hyperthermia:  Goal: Ability to maintain a body temperature in the normal range will improve  Description  Ability to maintain a body temperature in the normal range will improve  Outcome: Ongoing     Problem: Serum Glucose Level - Abnormal:  Goal: Ability to maintain appropriate glucose levels will improve  Description  Ability to maintain appropriate glucose levels will improve  Outcome: Ongoing     Problem: Sensory Perception - Impaired:  Goal: Ability to maintain a stable neurologic state will improve  Description  Ability to maintain a stable neurologic state will improve  Outcome: Ongoing Pt reports having been in the [facility] ED on Sat  Once home she reports getting a call informing her she has a compression fx of the 8th vertebrae, and to contact this office  Discussed with Nav Shah who reviewed studies and suggested the pt either go to [facility] ED for back brace, or come to this office for visit and brace  She prefers to go to [facility] it is closer  She was instructed to wear the brace when up but may remove to sleep  She was informed a new script for thoracolumbar study placed and she should complete prior to her next f/u in 3 weeks  She stated an understanding

## 2020-06-05 DIAGNOSIS — Z11.59 SPECIAL SCREENING EXAMINATION FOR VIRAL DISEASE: Primary | ICD-10-CM

## 2020-06-05 DIAGNOSIS — Z11.59 SPECIAL SCREENING EXAMINATION FOR VIRAL DISEASE: ICD-10-CM

## 2020-06-05 PROCEDURE — U0003 INFECTIOUS AGENT DETECTION BY NUCLEIC ACID (DNA OR RNA); SEVERE ACUTE RESPIRATORY SYNDROME CORONAVIRUS 2 (SARS-COV-2) (CORONAVIRUS DISEASE [COVID-19]), AMPLIFIED PROBE TECHNIQUE, MAKING USE OF HIGH THROUGHPUT TECHNOLOGIES AS DESCRIBED BY CMS-2020-01-R: HCPCS

## 2020-06-07 LAB — SARS-COV-2 RNA SPEC QL NAA+PROBE: NOT DETECTED

## 2020-06-10 ENCOUNTER — TELEPHONE (OUTPATIENT)
Dept: OTHER | Facility: OTHER | Age: 75
End: 2020-06-10

## 2020-09-01 ENCOUNTER — HOSPITAL ENCOUNTER (EMERGENCY)
Facility: HOSPITAL | Age: 75
Discharge: HOME | End: 2020-09-01
Attending: EMERGENCY MEDICINE
Payer: COMMERCIAL

## 2020-09-01 ENCOUNTER — APPOINTMENT (EMERGENCY)
Dept: CT IMAGING | Facility: HOSPITAL | Age: 75
End: 2020-09-01
Payer: COMMERCIAL

## 2020-09-01 VITALS
RESPIRATION RATE: 16 BRPM | DIASTOLIC BLOOD PRESSURE: 74 MMHG | BODY MASS INDEX: 24.97 KG/M2 | TEMPERATURE: 97.9 F | OXYGEN SATURATION: 97 % | SYSTOLIC BLOOD PRESSURE: 161 MMHG | WEIGHT: 149.9 LBS | HEIGHT: 65 IN | HEART RATE: 66 BPM

## 2020-09-01 DIAGNOSIS — M54.9 BACK PAIN: Primary | ICD-10-CM

## 2020-09-01 LAB
ALBUMIN SERPL BCP-MCNC: 4.2 G/DL (ref 3.5–5)
ALP SERPL-CCNC: 77 U/L (ref 46–116)
ALT SERPL W P-5'-P-CCNC: 23 U/L (ref 12–78)
ANION GAP SERPL CALCULATED.3IONS-SCNC: 8 MMOL/L (ref 4–13)
AST SERPL W P-5'-P-CCNC: 12 U/L (ref 5–45)
BACTERIA UR QL AUTO: ABNORMAL /HPF
BASOPHILS # BLD AUTO: 0.03 THOUSANDS/ΜL (ref 0–0.1)
BASOPHILS NFR BLD AUTO: 1 % (ref 0–1)
BILIRUB DIRECT SERPL-MCNC: 0.22 MG/DL (ref 0–0.2)
BILIRUB SERPL-MCNC: 1.3 MG/DL (ref 0.2–1)
BILIRUB UR QL STRIP: NEGATIVE
BUN SERPL-MCNC: 10 MG/DL (ref 5–25)
CALCIUM SERPL-MCNC: 9.8 MG/DL (ref 8.3–10.1)
CHLORIDE SERPL-SCNC: 106 MMOL/L (ref 100–108)
CLARITY UR: CLEAR
CO2 SERPL-SCNC: 25 MMOL/L (ref 21–32)
COLOR UR: YELLOW
CREAT SERPL-MCNC: 0.62 MG/DL (ref 0.6–1.3)
EOSINOPHIL # BLD AUTO: 0.1 THOUSAND/ΜL (ref 0–0.61)
EOSINOPHIL NFR BLD AUTO: 2 % (ref 0–6)
ERYTHROCYTE [DISTWIDTH] IN BLOOD BY AUTOMATED COUNT: 13.4 % (ref 11.6–15.1)
GFR SERPL CREATININE-BSD FRML MDRD: 90 ML/MIN/1.73SQ M
GLUCOSE SERPL-MCNC: 99 MG/DL (ref 65–140)
GLUCOSE UR STRIP-MCNC: NEGATIVE MG/DL
HCT VFR BLD AUTO: 39.2 % (ref 34.8–46.1)
HGB BLD-MCNC: 13.2 G/DL (ref 11.5–15.4)
HGB UR QL STRIP.AUTO: ABNORMAL
IMM GRANULOCYTES # BLD AUTO: 0.01 THOUSAND/UL (ref 0–0.2)
IMM GRANULOCYTES NFR BLD AUTO: 0 % (ref 0–2)
KETONES UR STRIP-MCNC: NEGATIVE MG/DL
LEUKOCYTE ESTERASE UR QL STRIP: NEGATIVE
LIPASE SERPL-CCNC: 130 U/L (ref 73–393)
LYMPHOCYTES # BLD AUTO: 1.12 THOUSANDS/ΜL (ref 0.6–4.47)
LYMPHOCYTES NFR BLD AUTO: 23 % (ref 14–44)
MCH RBC QN AUTO: 30.2 PG (ref 26.8–34.3)
MCHC RBC AUTO-ENTMCNC: 33.7 G/DL (ref 31.4–37.4)
MCV RBC AUTO: 90 FL (ref 82–98)
MONOCYTES # BLD AUTO: 0.41 THOUSAND/ΜL (ref 0.17–1.22)
MONOCYTES NFR BLD AUTO: 8 % (ref 4–12)
NEUTROPHILS # BLD AUTO: 3.22 THOUSANDS/ΜL (ref 1.85–7.62)
NEUTS SEG NFR BLD AUTO: 66 % (ref 43–75)
NITRITE UR QL STRIP: NEGATIVE
NON-SQ EPI CELLS URNS QL MICRO: ABNORMAL /HPF
NRBC BLD AUTO-RTO: 0 /100 WBCS
PH UR STRIP.AUTO: 6 [PH] (ref 4.5–8)
PLATELET # BLD AUTO: 269 THOUSANDS/UL (ref 149–390)
PMV BLD AUTO: 8.8 FL (ref 8.9–12.7)
POTASSIUM SERPL-SCNC: 3.9 MMOL/L (ref 3.5–5.3)
PROT SERPL-MCNC: 7.4 G/DL (ref 6.4–8.2)
PROT UR STRIP-MCNC: NEGATIVE MG/DL
RBC # BLD AUTO: 4.37 MILLION/UL (ref 3.81–5.12)
RBC #/AREA URNS AUTO: ABNORMAL /HPF
SODIUM SERPL-SCNC: 139 MMOL/L (ref 136–145)
SP GR UR STRIP.AUTO: 1.02 (ref 1–1.03)
UROBILINOGEN UR QL STRIP.AUTO: 0.2 E.U./DL
WBC # BLD AUTO: 4.89 THOUSAND/UL (ref 4.31–10.16)
WBC #/AREA URNS AUTO: ABNORMAL /HPF

## 2020-09-01 PROCEDURE — 99285 EMERGENCY DEPT VISIT HI MDM: CPT | Performed by: PHYSICIAN ASSISTANT

## 2020-09-01 PROCEDURE — 81001 URINALYSIS AUTO W/SCOPE: CPT

## 2020-09-01 PROCEDURE — 74176 CT ABD & PELVIS W/O CONTRAST: CPT

## 2020-09-01 PROCEDURE — 80048 BASIC METABOLIC PNL TOTAL CA: CPT | Performed by: PHYSICIAN ASSISTANT

## 2020-09-01 PROCEDURE — 36415 COLL VENOUS BLD VENIPUNCTURE: CPT | Performed by: PHYSICIAN ASSISTANT

## 2020-09-01 PROCEDURE — G1004 CDSM NDSC: HCPCS

## 2020-09-01 PROCEDURE — 96361 HYDRATE IV INFUSION ADD-ON: CPT

## 2020-09-01 PROCEDURE — 80076 HEPATIC FUNCTION PANEL: CPT | Performed by: PHYSICIAN ASSISTANT

## 2020-09-01 PROCEDURE — 83690 ASSAY OF LIPASE: CPT | Performed by: PHYSICIAN ASSISTANT

## 2020-09-01 PROCEDURE — 85025 COMPLETE CBC W/AUTO DIFF WBC: CPT | Performed by: PHYSICIAN ASSISTANT

## 2020-09-01 PROCEDURE — 96375 TX/PRO/DX INJ NEW DRUG ADDON: CPT

## 2020-09-01 PROCEDURE — 99284 EMERGENCY DEPT VISIT MOD MDM: CPT

## 2020-09-01 PROCEDURE — 96374 THER/PROPH/DIAG INJ IV PUSH: CPT

## 2020-09-01 RX ORDER — NAPROXEN 500 MG/1
500 TABLET ORAL 2 TIMES DAILY WITH MEALS
Qty: 30 TABLET | Refills: 0 | Status: SHIPPED | OUTPATIENT
Start: 2020-09-01

## 2020-09-01 RX ORDER — ONDANSETRON 2 MG/ML
4 INJECTION INTRAMUSCULAR; INTRAVENOUS ONCE
Status: COMPLETED | OUTPATIENT
Start: 2020-09-01 | End: 2020-09-01

## 2020-09-01 RX ORDER — KETOROLAC TROMETHAMINE 30 MG/ML
15 INJECTION, SOLUTION INTRAMUSCULAR; INTRAVENOUS ONCE
Status: COMPLETED | OUTPATIENT
Start: 2020-09-01 | End: 2020-09-01

## 2020-09-01 RX ADMIN — ONDANSETRON 4 MG: 2 INJECTION INTRAMUSCULAR; INTRAVENOUS at 14:31

## 2020-09-01 RX ADMIN — KETOROLAC TROMETHAMINE 15 MG: 30 INJECTION, SOLUTION INTRAMUSCULAR at 16:08

## 2020-09-01 RX ADMIN — SODIUM CHLORIDE 500 ML: 0.9 INJECTION, SOLUTION INTRAVENOUS at 14:31

## 2020-09-01 RX ADMIN — MORPHINE SULFATE 2 MG: 2 INJECTION, SOLUTION INTRAMUSCULAR; INTRAVENOUS at 14:31

## 2020-09-01 NOTE — ED PROVIDER NOTES
History  Chief Complaint   Patient presents with    Back Pain     Pt presents to the ED with c/o left sided back pain  Reports no injury, woke up with pain  Sesar Frey is a 69year-old female with history of osteopenia and T8 compression fracture in 2018 arriving ambulatory to the emergency department for evaluation of left-sided back pain over the flank region  Patient relates that the pain has been progressively worsening over the past couple of days  She denies any known trauma or injury, as well as heavy lifting  Patient describes the pain is intermittent episodes of sharp/stabbing  The pain is located over the left flank region and is non-radiating  She states that she has tried applying heat and ice without improvement of symptoms  She states that she does suffer with musculoskeletal back pain but notes that this particular exacerbation feels worse than prior episodes  She relates that muscle relaxants don't seem to offer her any relief  She does admit that prior to the pandemic she used to attend hot yoga classes and suggests that her pain may be worsening from a decrease in her exercise routine  Patient denies fevers, chills, neck pain, abdominal pain, nausea, vomiting, diarrhea, urinary symptoms to include dysuria, hematuria, urgency, frequency  Denies saddle anesthesia, extremity numbness or weakness, bladder/bowel incontinence  She denies any history of kidney stones  History provided by:  Patient  Back Pain   Pain location: left flank    Quality:  Stabbing  Radiates to:  Does not radiate  Pain severity:  Moderate  Pain is:  Unable to specify  Onset quality:  Gradual  Timing:  Intermittent  Progression:  Worsening  Chronicity:  New  Relieved by:  Nothing  Associated symptoms: no abdominal pain, no bladder incontinence, no bowel incontinence, no fever, no numbness, no paresthesias and no perianal numbness    Risk factors: hx of osteoporosis        Prior to Admission Medications Prescriptions Last Dose Informant Patient Reported? Taking? 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 ->3/17/2016 -> Had chemo -> 3 doses and radiation about 17     Non-Hodgkin lymphoma ([facility] [address] )     Sleep disorder        Past Surgical History:   Procedure Laterality Date    BLADDER REPAIR      HYSTERECTOMY      TONSILLECTOMY      TRIGGER FINGER RELEASE  01/08/2020    right middle finger       Family History   Problem Relation Age of Onset    Ovarian cancer Mother     Heart attack Father      I have reviewed and agree with the history as documented  E-Cigarette/Vaping    E-Cigarette Use Never User      E-Cigarette/Vaping Substances     Social History     Tobacco Use    Smoking status: Never Smoker    Smokeless tobacco: Never Used   Substance Use Topics    Alcohol use: Yes     Comment: socially    Drug use: No       Review of Systems   Constitutional: Negative for appetite change, chills and fever  Gastrointestinal: Negative for abdominal pain and bowel incontinence  Genitourinary: Negative for bladder incontinence  Musculoskeletal: Positive for back pain  Neurological: Negative for numbness and paresthesias  All other systems reviewed and are negative  Physical Exam  Physical Exam  Vitals signs and nursing note reviewed  Constitutional:       Appearance: Normal appearance   She is well-developed  She is not ill-appearing, toxic-appearing or diaphoretic  Comments: Very pleasant 69year-old female  Appears uncomfortable and unable to find a position of comfort   HENT:      Head: Normocephalic and atraumatic  Eyes:      Conjunctiva/sclera: Conjunctivae normal       Pupils: Pupils are equal, round, and reactive to light  Neck:      Musculoskeletal: Normal range of motion and neck supple  Cardiovascular:      Rate and Rhythm: Normal rate and regular rhythm  Heart sounds: Normal heart sounds  Pulmonary:      Effort: Pulmonary effort is normal  No respiratory distress  Breath sounds: Normal breath sounds  No wheezing  Abdominal:      General: Bowel sounds are normal  There is no distension  Palpations: Abdomen is soft  Tenderness: There is no abdominal tenderness  There is no right CVA tenderness or left CVA tenderness  Musculoskeletal: Normal range of motion  General: No tenderness  Right lower leg: No edema  Left lower leg: No edema  Comments: No midline c/t/l tenderness to palpation, bony deformity or step offs  No point tenderness in the midline spine  Full active ROM with symmetric strength in all extremities  Minimal reproducible tenderness to palpation over the left flank region  No palpable bony deformities  Skin:     General: Skin is warm and dry  Capillary Refill: Capillary refill takes less than 2 seconds  Findings: No rash  Comments: No rash or skin changes overlying the left flank region   Neurological:      General: No focal deficit present  Mental Status: She is alert  Mental status is at baseline  Sensory: Sensation is intact  No sensory deficit  Motor: Motor function is intact  No weakness  Gait: Gait normal       Comments: Neuro exam grossly non-focal  No extremity weakness or sensory deficits  Moving all extremities with symmetric strength   Gait steady in the exam room         Vital Signs  ED Triage Vitals   Temperature Pulse Respirations Blood Pressure SpO2   09/01/20 1239 09/01/20 1239 09/01/20 1239 09/01/20 1239 09/01/20 1239   97 9 °F (36 6 °C) 104 16 (!) 188/81 99 %      Temp src Heart Rate Source Patient Position - Orthostatic VS BP Location FiO2 (%)   -- 09/01/20 1239 09/01/20 1552 09/01/20 1239 --    Monitor Sitting Left arm       Pain Score       09/01/20 1239       9           Vitals:    09/01/20 1239 09/01/20 1552   BP: (!) 188/81 161/74   Pulse: 104 66   Patient Position - Orthostatic VS:  Sitting         Visual Acuity      ED Medications  Medications   sodium chloride 0 9 % bolus 500 mL (0 mL Intravenous Stopped 9/1/20 1609)   ondansetron (ZOFRAN) injection 4 mg (4 mg Intravenous Given 9/1/20 1431)   morphine injection 2 mg (2 mg Intravenous Given 9/1/20 1431)   ketorolac (TORADOL) injection 15 mg (15 mg Intravenous Given 9/1/20 1608)       Diagnostic Studies  Results Reviewed     Procedure Component Value Units Date/Time    Urine Microscopic [[id number]]  (Abnormal) Collected:  09/01/20 1549    Lab Status:  Final result Specimen:  Urine, Clean Catch Updated:  09/01/20 1614     RBC, UA 0-1 /hpf      WBC, UA 0-1 /hpf      Epithelial Cells Occasional /hpf      Bacteria, UA Occasional /hpf     Urine Macroscopic, POC [[id number]]  (Abnormal) Collected:  09/01/20 1549    Lab Status:  Final result Specimen:  Urine Updated:  09/01/20 1550     Color, UA Yellow     Clarity, UA Clear     pH, UA 6 0     Leukocytes, UA Negative     Nitrite, UA Negative     Protein, UA Negative mg/dl      Glucose, UA Negative mg/dl      Ketones, UA Negative mg/dl      Urobilinogen, UA 0 2 E U /dl      Bilirubin, UA Negative     Blood, UA Trace     Specific [place], UA 1 020    Narrative:       CLINITEK RESULT    Basic metabolic panel [[id number]] Collected:  09/01/20 1432    Lab Status:  Final result Specimen:  Blood from Arm, Left Updated:  09/01/20 1456     Sodium 139 mmol/L      Potassium 3 9 mmol/L Chloride 106 mmol/L      CO2 25 mmol/L      ANION GAP 8 mmol/L      BUN 10 mg/dL      Creatinine 0 62 mg/dL      Glucose 99 mg/dL      Calcium 9 8 mg/dL      eGFR 90 ml/min/1 73sq m     Narrative:       National Kid[employer] Disease Foundation guidelines for Chronic Kidney Disease (CKD):     Stage 1 with normal or high GFR (GFR > 90 mL/min/1 73 square meters)    Stage 2 Mild CKD (GFR = 60-89 mL/min/1 73 square meters)    Stage 3A Moderate CKD (GFR = 45-59 mL/min/1 73 square meters)    Stage 3B Moderate CKD (GFR = 30-44 mL/min/1 73 square meters)    Stage 4 Severe CKD (GFR = 15-29 mL/min/1 73 square meters)    Stage 5 End Stage CKD (GFR <15 mL/min/1 73 square meters)  Note: GFR calculation is accurate only with a steady state creatinine    Hepatic function panel [[id number]]  (Abnormal) Collected:  09/01/20 1432    Lab Status:  Final result Specimen:  Blood from Arm, Left Updated:  09/01/20 1456     Total Bilirubin 1 30 mg/dL      Bilirubin, Direct 0 22 mg/dL      Alkaline Phosphatase 77 U/L      AST 12 U/L      ALT 23 U/L      Total Protein 7 4 g/dL      Albumin 4 2 g/dL     Lipase [[id number]]  (Normal) Collected:  09/01/20 1432    Lab Status:  Final result Specimen:  Blood from Arm, Left Updated:  09/01/20 1449     Lipase 130 u/L     CBC and differential [[id number]]  (Abnormal) Collected:  09/01/20 1432    Lab Status:  Final result Specimen:  Blood from Arm, Left Updated:  09/01/20 1439     WBC 4 89 Thousand/uL      RBC 4 37 Million/uL      Hemoglobin 13 2 g/dL      Hematocrit 39 2 %      MCV 90 fL      MCH 30 2 pg      MCHC 33 7 g/dL      RDW 13 4 %      MPV 8 8 fL      Platelets 743 Thousands/uL      nRBC 0 /100 WBCs      Neutrophils Relative 66 %      Immat GRANS % 0 %      Lymphocytes Relative 23 %      Monocytes Relative 8 %      Eosinophils Relative 2 %      Basophils Relative 1 %      Neutrophils Absolute 3 22 Thousands/µL      Immature Grans Absolute 0 01 Thousand/uL      Lymphocytes Absolute 1 12 Thousands/µL      Monocytes Absolute 0 41 Thousand/µL      Eosinophils Absolute 0 10 Thousand/µL      Basophils Absolute 0 03 Thousands/µL                  CT abdomen pelvis wo contrast   Final Result by Quinn Tan MD (09/01 1408)      1   2 mm bladder calculus noted  No hydronephrosis  2   Mild constipation  Workstation performed: [id number]                    Procedures  Procedures         ED Course  ED Course as of Sep 02 1645   Tue Sep 01, 2020   1431 CT IMPRESSION:     1   2 mm bladder calculus noted  No hydronephrosis  2   Mild constipation  1500 Labs reviewed, findings within normal  1515 Patient reports significant relief after morphine, and is feeling much more comfortable  Advised of lab and imaging results  Awaiting urine  1620 Urine clear  Toradol given  Naproxen sent to pharmacy  Stable for discharge  MDM  Number of Diagnoses or Management Options  Back pain: new and requires workup  Diagnosis management comments: This is a 69year-old female with history of back pain, osteopenia, and T8 compression fracture presenting ambulatory to the ED for worsening back pain over the left flank region for the past few days  Patient denies any abdominal pain, n/v, urinary changes  She states pain feels similar to previous exacerbations of back pain but more intense  She admits that she has not been attending yoga lately because of the pandemic and feels her decrease in physical activity may have caused local spasm and stiffness  Differential diagnosis includes but not limited to: musculoskeletal pain, strain, sprain, spasm; rule out compression fracture, rule out renal calculi    Initial ED plan: labs, ct abd, fluids, pain control    Final ED Assessment: Vitals stable on arrival, examination documented as above  Patient reported significant symptomatic improvement after administration of morphine  No significant lab results or CT findings   CT reports bladder calculi but no renal stones  Patient advised of all findings  She states that she has a follow up appointment with her PCP tomorrow and was encouraged to keep this appointment for reassessment of symptoms  She is understanding and agreeable  Naproxen sent to pharmacy, encouraged local heat and stretching, can also try lidoderm patches  Patient monitored in the ED for greater than 3 hours without any new or worsening symptoms, pain was well-controlled in the ED, vitals remained stable  Stable for discharge home in good condition  Amount and/or Complexity of Data Reviewed  Clinical lab tests: ordered and reviewed  Tests in the radiology section of CPT®: ordered and reviewed  Review and summarize past medical records: yes  Discuss the patient with other providers: yes  Independent visualization of images, tracings, or specimens: yes    Risk of Complications, Morbidity, and/or Mortality  Presenting problems: moderate  Diagnostic procedures: moderate  Management options: moderate    Patient Progress  Patient progress: stable        Disposition  Final diagnoses:   Back pain     Time reflects when diagnosis was documented in both MDM as applicable and the Disposition within this note     Time User Action Codes Description Comment    9/1/2020  4:08 PM Karren Osler Add [M54 9] Back pain       ED Disposition     ED Disposition Condition Date/Time Comment    Discharge Stable Tue Sep 1, 2020  4:08 PM Luanne Lorenz discharge to home/self care              Follow-up Information     Follow up With Specialties Details Why Haley Beth MD Internal Medicine  Follow up tomorrow as scheduled [address]   [id number]  [id number]  [phone]            Discharge Medication List as of 9/1/2020  4:10 PM      START taking these medications    Details   naproxen (NAPROSYN) 500 mg tablet Take 1 tablet (500 mg total) by mouth 2 (two) times a day with meals, Starting Tue 9/1/2020, Normal         CONTINUE these medications which have NOT CHANGED    Details   albuterol (PROVENTIL HFA,VENTOLIN HFA) 90 mcg/act inhaler Inhale 2 puffs 4 (four) times a day, Starting Sat 5/18/2019, Normal      alendronate (FOSAMAX) 70 mg tablet Take 70 mg by mouth every 7 days, Historical Med      Calcium 600-200 MG-UNIT per tablet Take 1 tablet by mouth 2 (two) times a day, Historical Med      zolpidem (AMBIEN) 5 mg tablet Take 1 tablet by mouth daily, Starting Mon 7/30/2018, Historical Med           No discharge procedures on file      PDMP Review     None          ED Provider  Electronically Signed by           Leyda Sweet PA-C  09/02/20 9615

## 2021-01-20 DIAGNOSIS — Z23 ENCOUNTER FOR IMMUNIZATION: ICD-10-CM

## 2021-02-08 ENCOUNTER — IMMUNIZATIONS (OUTPATIENT)
Dept: FAMILY MEDICINE CLINIC | Facility: HOSPITAL | Age: 76
End: 2021-02-08

## 2021-02-08 DIAGNOSIS — Z23 ENCOUNTER FOR IMMUNIZATION: Primary | ICD-10-CM

## 2021-02-08 PROCEDURE — 91301 SARS-COV-2 / COVID-19 MRNA VACCINE (MODERNA) 100 MCG: CPT

## 2021-02-08 PROCEDURE — 0011A SARS-COV-2 / COVID-19 MRNA VACCINE (MODERNA) 100 MCG: CPT

## 2021-03-08 ENCOUNTER — IMMUNIZATIONS (OUTPATIENT)
Dept: FAMILY MEDICINE CLINIC | Facility: HOSPITAL | Age: 76
End: 2021-03-08

## 2021-03-08 DIAGNOSIS — Z23 ENCOUNTER FOR IMMUNIZATION: Primary | ICD-10-CM

## 2021-03-08 PROCEDURE — 0012A SARS-COV-2 / COVID-19 MRNA VACCINE (MODERNA) 100 MCG: CPT

## 2021-03-08 PROCEDURE — 91301 SARS-COV-2 / COVID-19 MRNA VACCINE (MODERNA) 100 MCG: CPT

## 2021-03-15 ENCOUNTER — HOSPITAL ENCOUNTER (EMERGENCY)
Facility: HOSPITAL | Age: 76
Discharge: HOME | End: 2021-03-15
Attending: EMERGENCY MEDICINE | Admitting: EMERGENCY MEDICINE
Payer: COMMERCIAL

## 2021-03-15 ENCOUNTER — APPOINTMENT (EMERGENCY)
Dept: RADIOLOGY | Facility: HOSPITAL | Age: 76
End: 2021-03-15
Payer: COMMERCIAL

## 2021-03-15 VITALS
RESPIRATION RATE: 18 BRPM | DIASTOLIC BLOOD PRESSURE: 92 MMHG | BODY MASS INDEX: 25.94 KG/M2 | WEIGHT: 155.87 LBS | HEART RATE: 98 BPM | OXYGEN SATURATION: 97 % | SYSTOLIC BLOOD PRESSURE: 131 MMHG | TEMPERATURE: 97.2 F

## 2021-03-15 DIAGNOSIS — R07.89 CHEST WALL PAIN: Primary | ICD-10-CM

## 2021-03-15 DIAGNOSIS — J98.11 ATELECTASIS OF RIGHT LUNG: ICD-10-CM

## 2021-03-15 LAB
ALBUMIN SERPL BCP-MCNC: 2.5 G/DL (ref 3.5–5)
ALP SERPL-CCNC: 90 U/L (ref 46–116)
ALT SERPL W P-5'-P-CCNC: 25 U/L (ref 12–78)
ANION GAP SERPL CALCULATED.3IONS-SCNC: 11 MMOL/L (ref 4–13)
AST SERPL W P-5'-P-CCNC: 15 U/L (ref 5–45)
ATRIAL RATE: 75 BPM
BASOPHILS # BLD AUTO: 0.05 THOUSANDS/ΜL (ref 0–0.1)
BASOPHILS NFR BLD AUTO: 1 % (ref 0–1)
BILIRUB SERPL-MCNC: 1.32 MG/DL (ref 0.2–1)
BUN SERPL-MCNC: 11 MG/DL (ref 5–25)
CALCIUM ALBUM COR SERPL-MCNC: 10.7 MG/DL (ref 8.3–10.1)
CALCIUM SERPL-MCNC: 9.5 MG/DL (ref 8.3–10.1)
CHLORIDE SERPL-SCNC: 106 MMOL/L (ref 100–108)
CO2 SERPL-SCNC: 24 MMOL/L (ref 21–32)
CREAT SERPL-MCNC: 0.73 MG/DL (ref 0.6–1.3)
D DIMER PPP FEU-MCNC: <0.27 UG/ML FEU
EOSINOPHIL # BLD AUTO: 0.14 THOUSAND/ΜL (ref 0–0.61)
EOSINOPHIL NFR BLD AUTO: 3 % (ref 0–6)
ERYTHROCYTE [DISTWIDTH] IN BLOOD BY AUTOMATED COUNT: 13.4 % (ref 11.6–15.1)
GFR SERPL CREATININE-BSD FRML MDRD: 81 ML/MIN/1.73SQ M
GLUCOSE SERPL-MCNC: 101 MG/DL (ref 65–140)
HCT VFR BLD AUTO: 42.2 % (ref 34.8–46.1)
HGB BLD-MCNC: 14 G/DL (ref 11.5–15.4)
IMM GRANULOCYTES # BLD AUTO: 0.01 THOUSAND/UL (ref 0–0.2)
IMM GRANULOCYTES NFR BLD AUTO: 0 % (ref 0–2)
LYMPHOCYTES # BLD AUTO: 1.02 THOUSANDS/ΜL (ref 0.6–4.47)
LYMPHOCYTES NFR BLD AUTO: 25 % (ref 14–44)
MCH RBC QN AUTO: 29.4 PG (ref 26.8–34.3)
MCHC RBC AUTO-ENTMCNC: 33.2 G/DL (ref 31.4–37.4)
MCV RBC AUTO: 89 FL (ref 82–98)
MONOCYTES # BLD AUTO: 0.32 THOUSAND/ΜL (ref 0.17–1.22)
MONOCYTES NFR BLD AUTO: 8 % (ref 4–12)
NEUTROPHILS # BLD AUTO: 2.62 THOUSANDS/ΜL (ref 1.85–7.62)
NEUTS SEG NFR BLD AUTO: 63 % (ref 43–75)
NRBC BLD AUTO-RTO: 0 /100 WBCS
P AXIS: 48 DEGREES
PLATELET # BLD AUTO: 258 THOUSANDS/UL (ref 149–390)
PMV BLD AUTO: 8.8 FL (ref 8.9–12.7)
POTASSIUM SERPL-SCNC: 3.9 MMOL/L (ref 3.5–5.3)
PR INTERVAL: 150 MS
PROT SERPL-MCNC: 7.6 G/DL (ref 6.4–8.2)
QRS AXIS: 54 DEGREES
QRSD INTERVAL: 94 MS
QT INTERVAL: 384 MS
QTC INTERVAL: 428 MS
RBC # BLD AUTO: 4.76 MILLION/UL (ref 3.81–5.12)
SODIUM SERPL-SCNC: 141 MMOL/L (ref 136–145)
T WAVE AXIS: 54 DEGREES
TROPONIN I SERPL-MCNC: <0.02 NG/ML
VENTRICULAR RATE: 75 BPM
WBC # BLD AUTO: 4.16 THOUSAND/UL (ref 4.31–10.16)

## 2021-03-15 PROCEDURE — 84484 ASSAY OF TROPONIN QUANT: CPT | Performed by: EMERGENCY MEDICINE

## 2021-03-15 PROCEDURE — 36415 COLL VENOUS BLD VENIPUNCTURE: CPT | Performed by: EMERGENCY MEDICINE

## 2021-03-15 PROCEDURE — 99285 EMERGENCY DEPT VISIT HI MDM: CPT | Performed by: EMERGENCY MEDICINE

## 2021-03-15 PROCEDURE — 71045 X-RAY EXAM CHEST 1 VIEW: CPT

## 2021-03-15 PROCEDURE — 85379 FIBRIN DEGRADATION QUANT: CPT | Performed by: EMERGENCY MEDICINE

## 2021-03-15 PROCEDURE — 93005 ELECTROCARDIOGRAM TRACING: CPT

## 2021-03-15 PROCEDURE — 80053 COMPREHEN METABOLIC PANEL: CPT | Performed by: EMERGENCY MEDICINE

## 2021-03-15 PROCEDURE — 93010 ELECTROCARDIOGRAM REPORT: CPT | Performed by: INTERNAL MEDICINE

## 2021-03-15 PROCEDURE — 85025 COMPLETE CBC W/AUTO DIFF WBC: CPT | Performed by: EMERGENCY MEDICINE

## 2021-03-15 PROCEDURE — 99284 EMERGENCY DEPT VISIT MOD MDM: CPT

## 2021-03-15 NOTE — ED PROVIDER NOTES
History  Chief Complaint   Patient presents with    Pain With Breathing     pt states since yesterday she started w/ right sided chest pain that seems to be worse with taking a "super deep breathe " minimal SOB w/ exercise per patient  76-year-old female presents today reporting right-sided chest discomfort that started yesterday morning  She noted the discomfort when she woke up  Discomfort has been constant since onset  She states that there was no injury, the discomfort is worse with deep breath or with changing position  She denies cough or fever  She states she has noticed over the last several months that she may be slightly more short of breath with exertion, however it is difficult to elicit from the patient how acute this problem is, it sounds like it has been going on for several months  History provided by:  Patient  Chest Pain  Pain location:  R chest  Pain quality: pressure    Pain radiates to:  Does not radiate  Pain radiates to the back: yes    Pain severity:  Mild  Onset quality:  Unable to specify  Duration:  2 days  Timing:  Constant  Progression:  Waxing and waning  Chronicity:  New  Context: at rest    Relieved by:  Nothing  Worsened by:  Deep breathing  Ineffective treatments:  None tried  Associated symptoms: no abdominal pain, no altered mental status, no back pain, no diaphoresis, no dizziness, no dysphagia, no fatigue, no fever, no headache, no heartburn, no lower extremity edema, no near-syncope, no numbness, no orthopnea, no palpitations and no shortness of breath    Risk factors: no coronary artery disease, no high cholesterol, no hypertension, not male, not obese and no smoking        Prior to Admission Medications   Prescriptions Last Dose Informant Patient Reported? Taking?    Calcium 600-200 MG-UNIT per tablet  Self Yes No   Sig: Take 1 tablet by mouth 2 (two) times a day   albuterol (PROVENTIL HFA,VENTOLIN HFA) 90 mcg/act inhaler   No No   Sig: Inhale 2 puffs 4 (four) times a day   Patient not taking: Reported on 6/17/2019   alendronate (FOSAMAX) 70 mg tablet  Self Yes No   Sig: Take 70 mg by mouth every 7 days   naproxen (NAPROSYN) 500 mg tablet   No No   Sig: Take 1 tablet (500 mg total) by mouth 2 (two) times a day with meals   zolpidem (AMBIEN) 5 mg tablet  Self Yes No   Sig: Take 1 tablet by mouth daily      Facility-Administered Medications: None       Past Medical History:   Diagnosis Date    Anxiety     History of bronchitis     Non Hodgkin's lymphoma (HCC)     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 ->3/17/2016 -> Had chemo -> 3 doses and radiation about 17     Non-Hodgkin [facility] ([facility] [address] )     Sleep disorder        Past Surgical History:   Procedure Laterality Date    BLADDER REPAIR      HYSTERECTOMY      TONSILLECTOMY      TRIGGER FINGER RELEASE  01/08/2020    right middle finger       Family History   Problem Relation Age of Onset    Ovarian cancer Mother     Heart attack Father      I have reviewed and agree with the history as documented  E-Cigarette/Vaping    E-Cigarette Use Never User      E-Cigarette/Vaping Substances     Social History     Tobacco Use    Smoking status: Never Smoker    Smokeless tobacco: Never Used   Substance Use Topics    Alcohol use: Yes     Comment: socially    Drug use: No       Review of Systems   Constitutional: Negative for chills, diaphoresis, fatigue and fever  HENT: Negative for postnasal drip, sore throat and trouble swallowing  Eyes: Negative for visual disturbance  Respiratory: Negative for chest tightness and shortness of breath  Cardiovascular: Positive for chest pain  Negative for palpitations, orthopnea and near-syncope  Gastrointestinal: Negative for abdominal pain and heartburn  Genitourinary: Negative for dysuria  Musculoskeletal: Negative for back pain  Skin: Negative for rash  Allergic/Immunologic: Negative for immunocompromised state     Neurological: Negative for dizziness, light-headedness, numbness and headaches  Psychiatric/Behavioral: Negative for confusion  Physical Exam  Physical Exam  Vitals signs and nursing note reviewed  Constitutional:       Appearance: She is well-developed  HENT:      Head: Normocephalic and atraumatic  Mouth/Throat:      Mouth: Mucous membranes are moist       Pharynx: Uvula midline  Tonsils: No tonsillar exudate  Eyes:      Pupils: Pupils are equal, round, and reactive to light  Neck:      Musculoskeletal: Normal range of motion and neck supple  Cardiovascular:      Rate and Rhythm: Normal rate and regular rhythm  Pulmonary:      Effort: Pulmonary effort is normal       Breath sounds: Normal breath sounds  Abdominal:      General: Bowel sounds are normal       Palpations: Abdomen is soft  Tenderness: There is no abdominal tenderness  There is no guarding or rebound  Musculoskeletal:         General: No tenderness or deformity  Right lower leg: No edema  Left lower leg: No edema  Skin:     General: Skin is warm and dry  Capillary Refill: Capillary refill takes less than 2 seconds  Neurological:      General: No focal deficit present  Mental Status: She is alert and oriented to person, place, and time  Comments: Patient moving all extremities equally, no focal neuro deficits noted         Psychiatric:         Mood and Affect: Mood normal          Behavior: Behavior normal          Vital Signs  ED Triage Vitals [03/15/21 1204]   Temperature Pulse Respirations Blood Pressure SpO2   (!) 97 2 °F (36 2 °C) 98 18 131/92 97 %      Temp Source Heart Rate Source Patient Position - Orthostatic VS BP Location FiO2 (%)   Oral Monitor Sitting Right arm --      Pain Score       7           Vitals:    03/15/21 1204   BP: 131/92   Pulse: 98   Patient Position - Orthostatic VS: Sitting         Visual Acuity      ED Medications  Medications - No data to display    Diagnostic Studies  Results Reviewed     Procedure Component Value Units Date/Time    Troponin I [[id number]]  (Normal) Collected: 03/15/21 1221    Lab Status: Final result Specimen: Blood from Arm, Left Updated: 03/15/21 1247     Troponin I <0 02 ng/mL     D-Dimer [[id number]]  (Normal) Collected: 03/15/21 1221    Lab Status: Final result Specimen: Blood from Arm, Left Updated: 03/15/21 1247     D-Dimer, Quant <0 27 ug/ml FEU     Comprehensive metabolic panel [[id number]]  (Abnormal) Collected: 03/15/21 1221    Lab Status: Final result Specimen: Blood from Arm, Left Updated: 03/15/21 1245     Sodium 141 mmol/L      Potassium 3 9 mmol/L      Chloride 106 mmol/L      CO2 24 mmol/L      ANION GAP 11 mmol/L      BUN 11 mg/dL      Creatinine 0 73 mg/dL      Glucose 101 mg/dL      Calcium 9 5 mg/dL      Corrected Calcium 10 7 mg/dL      AST 15 U/L      ALT 25 U/L      Alkaline Phosphatase 90 U/L      Total Protein 7 6 g/dL      Albumin 2 5 g/dL      Total Bilirubin 1 32 mg/dL      eGFR 81 ml/min/1 73sq m     Narrative:      [place] guidelines for Chronic Kidney Disease (CKD):     Stage 1 with normal or high GFR (GFR > 90 mL/min/1 73 square meters)    Stage 2 Mild CKD (GFR = 60-89 mL/min/1 73 square meters)    Stage 3A Moderate CKD (GFR = 45-59 mL/min/1 73 square meters)    Stage 3B Moderate CKD (GFR = 30-44 mL/min/1 73 square meters)    Stage 4 Severe CKD (GFR = 15-29 mL/min/1 73 square meters)    Stage 5 End Stage CKD (GFR <15 mL/min/1 73 square meters)  Note: GFR calculation is accurate only with a steady state creatinine    CBC and differential [[id number]]  (Abnormal) Collected: 03/15/21 1221    Lab Status: Final result Specimen: Blood from Arm, Left Updated: 03/15/21 1229     WBC 4 16 Thousand/uL      RBC 4 76 Million/uL      Hemoglobin 14 0 g/dL      Hematocrit 42 2 %      MCV 89 fL      MCH 29 4 pg      MCHC 33 2 g/dL      RDW 13 4 %      MPV 8 8 fL      Platelets 868 Thousands/uL      nRBC 0 /100 WBCs Neutrophils Relative 63 %      Immat GRANS % 0 %      Lymphocytes Relative 25 %      Monocytes Relative 8 %      Eosinophils Relative 3 %      Basophils Relative 1 %      Neutrophils Absolute 2 62 Thousands/µL      Immature Grans Absolute 0 01 Thousand/uL      Lymphocytes Absolute 1 02 Thousands/µL      Monocytes Absolute 0 32 Thousand/µL      Eosinophils Absolute 0 14 Thousand/µL      Basophils Absolute 0 05 Thousands/µL                  XR chest 1 view portable   Final Result by Miriam Law MD (03/15 1309)      Minimal right basilar atelectasis                  Workstation performed: [id number]                    Procedures  ECG 12 Lead Documentation Only    Date/Time: 3/15/2021 12:18 PM  Performed by: Kelly Guzman DO  Authorized by: Mike Reece DO     Indications / Diagnosis:  Right-sided chest pain  ECG reviewed by me, the ED Provider: yes    Patient location:  ED  Previous ECG:     Previous ECG:  Compared to current  Comments:      Normal sinus rhythm at 75 beats per minute  Normal axis, normal intervals, no ST T wave abnormalities suggestive of ischemia  QTC is normal   No significant change compared to prior from 05/26/2018               ED Course             HEART Risk Score      Most Recent Value   Heart Score Risk Calculator   History  0 Filed at: 03/15/2021 1209   ECG  0 Filed at: 03/15/2021 1209   Age  2 Filed at: 03/15/2021 1209   Risk Factors  1 Filed at: 03/15/2021 1209   Troponin  0 Filed at: 03/15/2021 1209   HEART Score  3 Filed at: 03/15/2021 1209              PERC Rule for PE      Most Recent Value   PERC Rule for PE   Age >=50  1 Filed at: 03/15/2021 1209   HR >=100  0 Filed at: 03/15/2021 1209   O2 Sat on room air < 95%  0 Filed at: 03/15/2021 1209   History of PE or DVT  0 Filed at: 03/15/2021 1209   Recent trauma or surgery  0 Filed at: 03/15/2021 1209   Hemoptysis  0 Filed at: 03/15/2021 1209   Exogenous estrogen  0 Filed at: 03/15/2021 1209   Unilateral leg swelling  0 Filed at: 03/15/2021 1209   [address] for PE Results  1 Filed at: 03/15/2021 1209                            [facility]  Number of Diagnoses or Management Options  Atelectasis of right lung: new and requires workup  Chest wall pain: new and requires workup  Diagnosis management comments: 2:02 PM  Late entry - labs negative  CXR shows mild RLL atelectasis  Unclear which came first, the atelectasis that's causing a little bit of pleurisy that's causing her chest pain or musculoskeletal chest pain that resulted in splinting which caused the atelectasis  Regardless, the patient is non toxic appearing and I have a low suspicion of cardiac chest pain or PE  Recommend antiinflammatories and incentive spirometry  F/u with PCP as outpatient  RTED instructions reviewed  Amount and/or Complexity of Data Reviewed  Clinical lab tests: ordered and reviewed  Tests in the radiology section of CPT®: reviewed and ordered  Tests in the medicine section of CPT®: ordered and reviewed  Review and summarize past medical records: yes  Independent visualization of images, tracings, or specimens: yes    Risk of Complications, Morbidity, and/or Mortality  Presenting problems: high  Diagnostic procedures: high  Management options: high    Patient Progress  Patient progress: stable      Disposition  Final diagnoses:   Chest wall pain   Atelectasis of right lung     Time reflects when diagnosis was documented in both MDM as applicable and the Disposition within this note     Time User Action Codes Description Comment    3/15/2021  1:20 PM Sudhir Simon Add [R07 89] Chest wall pain     3/15/2021  1:20 PM Lilly Reece Add [J98 11] Atelectasis of right lung       ED Disposition     ED Disposition Condition Date/Time Comment    Discharge Stable Mon Mar 15, 2021  1:20 PM Luanne Lorenz discharge to home/self care              Follow-up Information     Follow up With Specialties Details Why Contact Info Additional Gonzales Montiel MD Internal Medicine Schedule an appointment as soon as possible for a visit  As needed [address]  [facility] Emergency Department Emergency Medicine  If symptoms worsen [address] [facility] [address] [facility] Emergency Department, [address], [place], [address] [facility], [address]          Discharge Medication List as of 3/15/2021  1:21 PM      CONTINUE these medications which have NOT CHANGED    Details   albuterol (PROVENTIL HFA,VENTOLIN HFA) 90 mcg/act inhaler Inhale 2 puffs 4 (four) times a day, Starting Sat 5/18/2019, Normal      alendronate (FOSAMAX) 70 mg tablet Take 70 mg by mouth every 7 days, Historical Med      Calcium 600-200 MG-UNIT per tablet Take 1 tablet by mouth 2 (two) times a day, Historical Med      naproxen (NAPROSYN) 500 mg tablet Take 1 tablet (500 mg total) by mouth 2 (two) times a day with meals, Starting Tue 9/1/2020, Normal      zolpidem (AMBIEN) 5 mg tablet Take 1 tablet by mouth daily, Starting Mon 7/30/2018, Historical Med           No discharge procedures on file      PDMP Review     None          ED Provider  Electronically Signed by           Lois Jeff,   03/15/21 9497

## 2021-03-15 NOTE — ED NOTES
Gave pt education on use & frequency of incentive spirometry  Pt understands       Michelet Hicks, RN  03/15/21 3354

## 2021-08-16 ENCOUNTER — OFFICE VISIT (OUTPATIENT)
Dept: OBGYN CLINIC | Facility: CLINIC | Age: 76
End: 2021-08-16
Payer: COMMERCIAL

## 2021-08-16 ENCOUNTER — APPOINTMENT (OUTPATIENT)
Dept: RADIOLOGY | Facility: CLINIC | Age: 76
End: 2021-08-16
Payer: COMMERCIAL

## 2021-08-16 VITALS
WEIGHT: 144 LBS | HEART RATE: 92 BPM | BODY MASS INDEX: 23.99 KG/M2 | HEIGHT: 65 IN | DIASTOLIC BLOOD PRESSURE: 81 MMHG | SYSTOLIC BLOOD PRESSURE: 167 MMHG

## 2021-08-16 DIAGNOSIS — S12.101A CLOSED NONDISPLACED FRACTURE OF SECOND CERVICAL VERTEBRA, UNSPECIFIED FRACTURE MORPHOLOGY, INITIAL ENCOUNTER (HCC): ICD-10-CM

## 2021-08-16 DIAGNOSIS — M54.6 CHRONIC BILATERAL THORACIC BACK PAIN: ICD-10-CM

## 2021-08-16 DIAGNOSIS — G89.29 CHRONIC BILATERAL THORACIC BACK PAIN: ICD-10-CM

## 2021-08-16 DIAGNOSIS — M54.6 CHRONIC BILATERAL THORACIC BACK PAIN: Primary | ICD-10-CM

## 2021-08-16 DIAGNOSIS — G89.29 CHRONIC BILATERAL THORACIC BACK PAIN: Primary | ICD-10-CM

## 2021-08-16 PROCEDURE — 3008F BODY MASS INDEX DOCD: CPT | Performed by: ORTHOPAEDIC SURGERY

## 2021-08-16 PROCEDURE — 1036F TOBACCO NON-USER: CPT | Performed by: ORTHOPAEDIC SURGERY

## 2021-08-16 PROCEDURE — 72070 X-RAY EXAM THORAC SPINE 2VWS: CPT

## 2021-08-16 PROCEDURE — 99213 OFFICE O/P EST LOW 20 MIN: CPT | Performed by: ORTHOPAEDIC SURGERY

## 2021-08-16 PROCEDURE — 1160F RVW MEDS BY RX/DR IN RCRD: CPT | Performed by: ORTHOPAEDIC SURGERY
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
## 2021-08-26 ENCOUNTER — EVALUATION (OUTPATIENT)
Dept: PHYSICAL THERAPY | Facility: CLINIC | Age: 76
End: 2021-08-26
Payer: COMMERCIAL

## 2021-08-26 DIAGNOSIS — M54.6 CHRONIC BILATERAL THORACIC BACK PAIN: ICD-10-CM

## 2021-08-26 DIAGNOSIS — G89.29 CHRONIC BILATERAL THORACIC BACK PAIN: ICD-10-CM

## 2021-08-26 PROCEDURE — 97112 NEUROMUSCULAR REEDUCATION: CPT | Performed by: PHYSICAL THERAPIST

## 2021-08-26 PROCEDURE — 97110 THERAPEUTIC EXERCISES: CPT | Performed by: PHYSICAL THERAPIST

## 2021-08-26 PROCEDURE — 97162 PT EVAL MOD COMPLEX 30 MIN: CPT | Performed by: PHYSICAL THERAPIST

## 2021-08-26 NOTE — PROGRESS NOTES
PT Evaluation     Today's date: 2021  Patient name: Alfredo Robles  : 1945  MRN: [id number]  Referring provider: Luz Maria Hartmann MD  Dx:   Encounter Diagnosis     ICD-10-CM    1  Chronic bilateral thoracic back pain  M54 6 Ambulatory referral to Physical Therapy    G89 29                   Assessment  Assessment details: Alfredo Robles is a pleasant 77 y o  presenting to physical therapy with MD referral for Chronic bilateral thoracic back pain  Problem list: Poor posture, decreased upper extremity strength, increased muscle tension, and reduced thoracic ROM    Treatment to include: Manual therapy techniques, thoracic A/AA/PROM, RTC/periscapular strengthening,  instruction in a comprehensive HEP, and modalities as needed  This pt would benefit from skilled PT services to address their impairments and functional limitations to maximize functional outcome  Symptom irritability: moderateBarriers to therapy: Chronicity of symptoms  Understanding of Dx/Px/POC: good   Prognosis: good    Goals  ST  Pt will improve thoracic extension to no more than moderate restriction in 3 weeks  2  Pt will improve lower trapezius strength to at least 4/5 in 3 weeks  LT  Pt will be able to stand to cook at least 30 minutes with minimal to no pain in 6 weeks  2  Pt will be independent in a comprehensive HEP in 6 weeks  Plan  Patient would benefit from: skilled physical therapy  Frequency: 2x week  Duration in weeks: 6  Treatment plan discussed with: patient        Subjective Evaluation    History of Present Illness  Mechanism of injury: Pt reports she has a history of chronic pain in thoracic spine  Pt reports pain often 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  Pt reports pain occurs in tight upper trap, right lower back, and left thoracic spine  Pt states the pain typically settles in mid thoracic spine on the left   Per pt, she goes to the ER due to the pain and overtime has been given muscle relaxors and NSAIDs with no improvement in symptoms  Pt's most recent episode of pain began 2021  Pt had recent x-rays which revealed "Unchanged chronic wedge compression deformity of T8"  Pt saw orthopedic surgeon who educated pt she is not a candidate for injections or surgery concerning this issue and referred pt to physical therapy on 21  Pt saw PCP the following day who assured pt the symptoms are muscular in nature  Pt reports she does a strengthening class two times per week as well as yoga  Pt reports her symptoms improved over the weekend; however, she fell yesterday and injured the right side of her lower back  Pt denies any numbness or tingling and denies any change with breathing in/out  Pain  Current pain ratin  At best pain ratin  At worst pain ratin  Location: left side of thoracic spine and right lumbar spine  Quality: spasms  Relieving factors: heat  Aggravating factors: standing  Progression: improved      Diagnostic Tests  X-ray: abnormal  Treatments  Treatments tried: none    Current treatment: physical therapy  Patient Goals  Patient goal: Learn HEP to avoid this pain from happenening again        Objective     Postural Observations  Seated posture: fair  Standing posture: fair    Additional Postural Observation Details  Moderate forward rounded shoulders and forward head posture    Palpation     Additional Palpation Details  TTP over mid to lower thoracic PVMs, right QL, and bilateral middle trap/rhomboids with increased tension noted    Active Range of Motion   Cervical/Thoracic Spine       Thoracic    Flexion:  Restriction level: minimal  Extension:  Restriction level: maximal  Left lateral flexion:  Restriction level: moderate  Right lateral flexion:  with pain Restriction level: moderate  Left rotation:  Restriction level: minimal  Right rotation:  Restriction level: minimal    Strength/Myotome Testing     Left Shoulder     Planes of Motion   Flexion: 4+ Abduction: 4+   External rotation at 0°: 5   Internal rotation at 0°: 5     Isolated Muscles   Lower trapezius: 4-   Middle trapezius: 4-     Right Shoulder     Planes of Motion   Flexion: 4+   Abduction: 4   External rotation at 0°: 4+   Internal rotation at 0°: 4+     Isolated Muscles   Lower trapezius: 4-   Middle trapezius: 4-     Left Elbow   Flexion: 5  Extension: 5    Right Elbow   Flexion: 5  Extension: 5             Precautions: 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, anxiety, non-hodgkins lymphoma      Manuals 8-26 (IE)            STM to B mid thoracic PVMs, rhomboids NV                                                   Neuro Re-Ed             Cervical retraction 10 x 10"             Scapular retraction 10 x 10"            Slouch and overcorrect 10 x 10" NV                         TB:             - rows 2 x 10 RTB NV            - pulldowns 2  x 10 RTB NV            - no money 20 x 5" RTB NV                                      Ther Ex             UBE (ant/post) standing 3'/3' L1 NV                         Corner stretch 4 x 30" NV            Seated upper thoracic ext over half foam (compression deformity at T8) 15 x 5" NV                                      Prone:             - I's 10 x 5" ea NV            - T's 10 x 5" ea NV            - Y's 10 x 5" ea NV                         Ther Activity                                       Gait Training                                       Modalities             MH as needed                            * On initial evaluation, educated pt on anatomy, pathology, and exercise rationale  Provided pt with basic HEP and ensured proper exercise performance  Educated pt to call with any questions or concerns  Significant time spent on educating pt on activity modification and posture  Access Code: [email or web address]  URL: [email or web address]  Date: 08/26/2021  Prepared by: Russ Velez    Exercises  Corner Pec Major Stretch - 3 x daily - 4 reps - 30 seconds hold  Seated Cervical Retraction - 3 x daily - 10 reps - 10 seconds hold  Seated Scapular Retraction - 3 x daily - 10 reps - 10 seconds hold

## 2021-08-31 NOTE — PROGRESS NOTES
Daily Note     Today's date: 2021  Patient name: Rosa Mccarthy  : 1945  MRN: [id number]  Referring provider: Rony Salinas MD  Dx:   Encounter Diagnosis     ICD-10-CM    1  Chronic bilateral thoracic back pain  M54 6     G89 29                   Subjective: Patient states that the pain she came to PT for has subsided  Wants to learn exercises to prevent it from coming back  Objective: See treatment diary below      Assessment: Tolerated treatment well  Patient demonstrated fatigue post treatment  Patient states her arms feel very tired after the session  No complaints of pain during or after session  VC's for correct performance of cervical retractions  Plan: Continue per plan of care        Precautions: 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, anxiety, non-hodgkins lymphoma      Manuals 8- (IE)            STM to B mid thoracic PVMs, rhomboids NV                                                   Neuro Re-Ed             Cervical retraction 10 x 10"  10 x 10"            Scapular retraction 10 x 10" 10 x 10"            Slouch and overcorrect 10 x 10" NV 10 x 10"                        TB:             - rows 2 x 10 RTB NV 2 x 10 GTB           - pulldowns 2  x 10 RTB NV 2 x 10 GTB           - no money 20 x 5" RTB NV 2 x 10 GTB                                     Ther Ex             UBE (ant/post) standing 3'/3' L1 NV 3'/3' L1                        Corner stretch 4 x 30" NV 4x30"           Seated upper thoracic ext over half foam (compression deformity at T8) 15 x 5" NV 15 x 5"                                     Prone:             - I's 10 x 5" ea NV 10 x 5" ea           - T's 10 x 5" ea NV 10 x 5" ea           - Y's 10 x 5" ea NV 10 x 5" ea                        Ther Activity                                       Gait Training                                       Modalities             MH as needed

## 2021-09-01 ENCOUNTER — OFFICE VISIT (OUTPATIENT)
Dept: PHYSICAL THERAPY | Facility: CLINIC | Age: 76
End: 2021-09-01
Payer: COMMERCIAL

## 2021-09-01 DIAGNOSIS — M54.6 CHRONIC BILATERAL THORACIC BACK PAIN: Primary | ICD-10-CM

## 2021-09-01 DIAGNOSIS — G89.29 CHRONIC BILATERAL THORACIC BACK PAIN: Primary | ICD-10-CM

## 2021-09-01 PROCEDURE — 97110 THERAPEUTIC EXERCISES: CPT | Performed by: PHYSICAL THERAPIST

## 2021-09-01 PROCEDURE — 97112 NEUROMUSCULAR REEDUCATION: CPT | Performed by: PHYSICAL THERAPIST

## 2021-09-08 ENCOUNTER — OFFICE VISIT (OUTPATIENT)
Dept: PHYSICAL THERAPY | Facility: CLINIC | Age: 76
End: 2021-09-08
Payer: COMMERCIAL

## 2021-09-08 DIAGNOSIS — M54.6 CHRONIC BILATERAL THORACIC BACK PAIN: Primary | ICD-10-CM

## 2021-09-08 DIAGNOSIS — G89.29 CHRONIC BILATERAL THORACIC BACK PAIN: Primary | ICD-10-CM

## 2021-09-08 PROCEDURE — 97112 NEUROMUSCULAR REEDUCATION: CPT | Performed by: PHYSICAL THERAPIST

## 2021-09-08 PROCEDURE — 97110 THERAPEUTIC EXERCISES: CPT | Performed by: PHYSICAL THERAPIST

## 2021-09-08 NOTE — PROGRESS NOTES
Daily Note     Today's date: 2021  Patient name: Forrest Benitez  : 1945  MRN: [id number]  Referring provider: Danny Buckner MD  Dx:   Encounter Diagnosis     ICD-10-CM    1  Chronic bilateral thoracic back pain  M54 6     G89 29                   Subjective: Pt reports no increase in pain or adverse reactions following previous session  Objective: See treatment diary below      Assessment: Progressed exercises this session as charted in sets  Pt was able to tolerate all exercises this date with minimal increase in pain/discomfort  Pt was very challenged by exercise progressions this date  Tolerated treatment well  Patient demonstrated fatigue post treatment, exhibited good technique with therapeutic exercises and would benefit from continued PT      Plan: Progress treatment as tolerated         Precautions: 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, anxiety, non-hodgkins lymphoma      Manuals 8-26 (IE) -          STM to B mid thoracic PVMs, rhomboids NV                                                   Neuro Re-Ed             Cervical retraction 10 x 10"  10 x 10"  10 x 10"          Scapular retraction 10 x 10" 10 x 10"  10 x 10"          Slouch and overcorrect 10 x 10" NV 10 x 10" 10 x 10"                       TB:             - rows 2 x 10 RTB NV 2 x 10 GTB 3 x 10 GTB          - pulldowns 2  x 10 RTB NV 2 x 10 GTB 3 x 10 GTB          - no money 20 x 5" RTB NV 2 x 10 GTB 20 x 5" GTB                                    Ther Ex             UBE (ant/post) standing 3'/3' L1 NV 3'/3' L1 3'/3' L1                       Corner stretch 4 x 30" NV 4x30" 4 x 30"          Seated upper thoracic ext over half foam (compression deformity at T8) 15 x 5" NV 15 x 5" 20 x 5"                                    Prone:             - I's 10 x 5" ea NV 10 x 5" ea 10 x 5" ea          - T's 10 x 5" ea NV 10 x 5" ea 10 x 5" ea          - Y's 10 x 5" ea NV 10 x 5" ea 10 x 5" ea                       Ther Activity Gait Training                                       Modalities             MH as needed

## 2021-09-10 ENCOUNTER — OFFICE VISIT (OUTPATIENT)
Dept: PHYSICAL THERAPY | Facility: CLINIC | Age: 76
End: 2021-09-10
Payer: COMMERCIAL

## 2021-09-10 DIAGNOSIS — G89.29 CHRONIC BILATERAL THORACIC BACK PAIN: Primary | ICD-10-CM

## 2021-09-10 DIAGNOSIS — M54.6 CHRONIC BILATERAL THORACIC BACK PAIN: Primary | ICD-10-CM

## 2021-09-10 PROCEDURE — 97110 THERAPEUTIC EXERCISES: CPT | Performed by: PHYSICAL THERAPIST

## 2021-09-10 NOTE — PROGRESS NOTES
Daily Note     Today's date: 9/10/2021  Patient name: Zack Menjivar  : 1945  MRN: [id number]  Referring provider: Sahra Woods MD  Dx:   Encounter Diagnosis     ICD-10-CM    1  Chronic bilateral thoracic back pain  M54 6     G89 29        Start Time: 1211          Subjective: Patient reports that she no longer experiences muscle spasm, but does have her typical soreness  Objective: See treatment diary below      Assessment: Tolerated treatment well  Patient demonstrated fatigue post treatment and would benefit from continued PT  Significant portion of treatment session spent reviewing HEP and preventative care  Plan: Continue per plan of care  Progress treatment as tolerated  Precautions: 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, anxiety, non-hodgkins lymphoma      Manuals 8-26 (IE)  9-8 9/10         STM to B mid thoracic PVMs, rhomboids NV                                                   Neuro Re-Ed             Cervical retraction 10 x 10"  10 x 10"  10 x 10"          Scapular retraction 10 x 10" 10 x 10"  10 x 10"          Slouch and overcorrect 10 x 10" NV 10 x 10" 10 x 10"                       TB:             - rows 2 x 10 RTB NV 2 x 10 GTB 3 x 10 GTB          - pulldowns 2  x 10 RTB NV 2 x 10 GTB 3 x 10 GTB          - no money 20 x 5" RTB NV 2 x 10 GTB 20 x 5" GTB                                    Ther Ex             UBE (ant/post) standing 3'/3' L1 NV 3'/3' L1 3'/3' L1          Patient education / HEP review    GR         Corner stretch 4 x 30" NV 4x30" 4 x 30" 4x30"         Seated upper thoracic ext over half foam (compression deformity at T8) 15 x 5" NV 15 x 5" 20 x 5"                                    Prone:             - I's 10 x 5" ea NV 10 x 5" ea 10 x 5" ea 15x5" ea  - T's 10 x 5" ea NV 10 x 5" ea 10 x 5" ea 15x5" ea           - Y's 10 x 5" ea NV 10 x 5" ea 10 x 5" ea Reviewed                      Ther Activity                                       Gait Training Modalities             MH as needed

## 2021-09-15 ENCOUNTER — OFFICE VISIT (OUTPATIENT)
Dept: PHYSICAL THERAPY | Facility: CLINIC | Age: 76
End: 2021-09-15
Payer: COMMERCIAL

## 2021-09-15 DIAGNOSIS — M54.6 CHRONIC BILATERAL THORACIC BACK PAIN: Primary | ICD-10-CM

## 2021-09-15 DIAGNOSIS — G89.29 CHRONIC BILATERAL THORACIC BACK PAIN: Primary | ICD-10-CM

## 2021-09-15 PROCEDURE — 97110 THERAPEUTIC EXERCISES: CPT

## 2021-09-15 PROCEDURE — 97112 NEUROMUSCULAR REEDUCATION: CPT

## 2021-09-15 NOTE — PROGRESS NOTES
Daily Note     Today's date: 9/15/2021  Patient name: Marietta Bell  : 1945  MRN: [id number]  Referring provider: Terrence Morgan MD  Dx:   Encounter Diagnosis     ICD-10-CM    1  Chronic bilateral thoracic back pain  M54 6     G89 29                   Subjective: Patient reports she has been feeling good since last session  She denies thoracic back pain at beginning of session  Patient states states she has not experienced any muscle spasms since last session  Patient reports compliance with HEP and states it has been going well  Objective: See treatment diary below      Assessment: Tolerated treatment well  Moderate fatigue noted when performing pulldowns with TB, however, these remain tolerable  Good technique noted throughout TE program  Patient unable to perform 15 reps of prone ITYs due to fatigue- adjusted reps to to 10 and this was tolerated well  Patient would benefit from continued PT to increase cervical and thoracic strength and improve postural awareness for improved function in ADLs  Plan: Continue per plan of care        Precautions: 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, anxiety, non-hodgkins lymphoma      Manuals 8-26 (IE)  9-8 9/10 9/15        STM to B mid thoracic PVMs, rhomboids NV                                                   Neuro Re-Ed             Cervical retraction 10 x 10"  10 x 10"  10 x 10"  10 x 10"        Scapular retraction 10 x 10" 10 x 10"  10 x 10"  10 x 10"        Slouch and overcorrect 10 x 10" NV 10 x 10" 10 x 10"  10 x 10"                     TB:             - rows 2 x 10 RTB NV 2 x 10 GTB 3 x 10 GTB  3 x 10 GTB        - pulldowns 2  x 10 RTB NV 2 x 10 GTB 3 x 10 GTB  3 x 10 GTB        - no money 20 x 5" RTB NV 2 x 10 GTB 20 x 5" GTB  20 x 5" GTB                                  Ther Ex             UBE (ant/post) standing 3'/3' L1 NV 3'/3' L1 3'/3' L1  3'/3' L1        Patient education / HEP review    GR         Corner stretch 4 x 30" NV 4x30" 4 x 30" 4x30" 4x30" Seated upper thoracic ext over half foam (compression deformity at T8) 15 x 5" NV 15 x 5" 20 x 5"  20 x 5"                                  Prone:             - I's 10 x 5" ea NV 10 x 5" ea 10 x 5" ea 15x5" ea  10x5" ea        - T's 10 x 5" ea NV 10 x 5" ea 10 x 5" ea 15x5" ea   15x5" ea        - Y's 10 x 5" ea NV 10 x 5" ea 10 x 5" ea Reviewed 10x5" ea                     Ther Activity                                       Gait Training                                       Modalities             MH as needed

## 2021-09-17 ENCOUNTER — APPOINTMENT (OUTPATIENT)
Dept: PHYSICAL THERAPY | Facility: CLINIC | Age: 76
End: 2021-09-17
Payer: COMMERCIAL

## 2021-09-21 ENCOUNTER — OFFICE VISIT (OUTPATIENT)
Dept: PHYSICAL THERAPY | Facility: CLINIC | Age: 76
End: 2021-09-21
Payer: COMMERCIAL

## 2021-09-21 DIAGNOSIS — M54.6 CHRONIC BILATERAL THORACIC BACK PAIN: Primary | ICD-10-CM

## 2021-09-21 DIAGNOSIS — G89.29 CHRONIC BILATERAL THORACIC BACK PAIN: Primary | ICD-10-CM

## 2021-09-21 PROCEDURE — 97112 NEUROMUSCULAR REEDUCATION: CPT | Performed by: PHYSICAL THERAPIST

## 2021-09-21 PROCEDURE — 97110 THERAPEUTIC EXERCISES: CPT | Performed by: PHYSICAL THERAPIST

## 2021-09-21 NOTE — PROGRESS NOTES
Daily Note     Today's date: 2021  Patient name: Bandar Blackwell  : 1945  MRN: [id number]  Referring provider: Barbara Jauregui MD  Dx:   Encounter Diagnosis     ICD-10-CM    1  Chronic bilateral thoracic back pain  M54 6     G89 29        Start Time: 1212          Subjective: Patient is feeling very well and would like for today to be her last day in PT      Objective: See treatment diary below      Assessment: Tolerated treatment well  Patient exhibited good technique with therapeutic exercises  Patient independent and compliant with a HEP  Pain-free and has met all goals  Improved postural awareness  Plan: Discharge PT per patient request      Precautions: 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, anxiety, non-hodgkins lymphoma      Manuals 8-26 (IE)  9-8 9/10 9/15 9/21       STM to B mid thoracic PVMs, rhomboids NV                                                   Neuro Re-Ed             Cervical retraction 10 x 10"  10 x 10"  10 x 10"  10 x 10" 10 x 10"       Scapular retraction 10 x 10" 10 x 10"  10 x 10"  10 x 10" 10 x 10"       Slouch and overcorrect 10 x 10" NV 10 x 10" 10 x 10"  10 x 10" 10 x 10"                    TB:             - rows 2 x 10 RTB NV 2 x 10 GTB 3 x 10 GTB  3 x 10 GTB 3 x 10 GTB       - pulldowns 2  x 10 RTB NV 2 x 10 GTB 3 x 10 GTB  3 x 10 GTB 3 x 10 GTB       - no money 20 x 5" RTB NV 2 x 10 GTB 20 x 5" GTB  20 x 5" GTB                                  Ther Ex             UBE (ant/post) standing 3'/3' L1 NV 3'/3' L1 3'/3' L1  3'/3' L1 3'/3' L1       Patient education / HEP review    GR         Corner stretch 4 x 30" NV 4x30" 4 x 30" 4x30" 4x30" 4x30"       Seated upper thoracic ext over half foam (compression deformity at T8) 15 x 5" NV 15 x 5" 20 x 5"  20 x 5" 20 x 5"                                 Prone:             - I's 10 x 5" ea NV 10 x 5" ea 10 x 5" ea 15x5" ea  10x5" ea Declined today       - T's 10 x 5" ea NV 10 x 5" ea 10 x 5" ea 15x5" ea   15x5" ea Declined today       - Y's 10 x 5" ea NV 10 x 5" ea 10 x 5" ea Reviewed 10x5" ea Declined today                    Ther Activity                                       Gait Training                                       Modalities             MH as needed

## 2021-09-23 ENCOUNTER — APPOINTMENT (OUTPATIENT)
Dept: PHYSICAL THERAPY | Facility: CLINIC | Age: 76
End: 2021-09-23
Payer: COMMERCIAL

## 2021-09-27 ENCOUNTER — APPOINTMENT (OUTPATIENT)
Dept: PHYSICAL THERAPY | Facility: CLINIC | Age: 76
End: 2021-09-27
Payer: COMMERCIAL

## 2021-09-30 ENCOUNTER — APPOINTMENT (OUTPATIENT)
Dept: PHYSICAL THERAPY | Facility: CLINIC | Age: 76
End: 2021-09-30
Payer: COMMERCIAL

## 2021-11-10 ENCOUNTER — IMMUNIZATIONS (OUTPATIENT)
Dept: FAMILY MEDICINE CLINIC | Facility: HOSPITAL | Age: 76
End: 2021-11-10

## 2021-11-10 DIAGNOSIS — Z23 ENCOUNTER FOR IMMUNIZATION: Primary | ICD-10-CM

## 2021-11-10 PROCEDURE — 0013A COVID-19 MODERNA VACC 0.25 ML BOOSTER: CPT

## 2021-11-10 PROCEDURE — 91306 COVID-19 MODERNA VACC 0.25 ML BOOSTER: CPT

## 2022-01-06 PROCEDURE — U0005 INFEC AGEN DETEC AMPLI PROBE: HCPCS | Performed by: INTERNAL MEDICINE

## 2022-01-06 PROCEDURE — U0003 INFECTIOUS AGENT DETECTION BY NUCLEIC ACID (DNA OR RNA); SEVERE ACUTE RESPIRATORY SYNDROME CORONAVIRUS 2 (SARS-COV-2) (CORONAVIRUS DISEASE [COVID-19]), AMPLIFIED PROBE TECHNIQUE, MAKING USE OF HIGH THROUGHPUT TECHNOLOGIES AS DESCRIBED BY CMS-2020-01-R: HCPCS | Performed by: INTERNAL MEDICINE

## 2022-08-22 ENCOUNTER — TELEPHONE (OUTPATIENT)
Dept: OBGYN CLINIC | Facility: HOSPITAL | Age: 77
End: 2022-08-22

## 2022-08-22 NOTE — TELEPHONE ENCOUNTER
Dr Anup Mitchell  RE:  Appt right thumb  CB# [phone]    Patient would like appt for right thumb  No recent xrays, no surgery      Ken Harry MC    Thank you

## 2022-10-06 PROBLEM — K62.5 RECTAL BLEED: Status: ACTIVE | Noted: 2022-10-06

## 2022-10-15 ENCOUNTER — OFFICE VISIT (OUTPATIENT)
Dept: URGENT CARE | Facility: CLINIC | Age: 77
End: 2022-10-15
Payer: COMMERCIAL

## 2022-10-15 VITALS
WEIGHT: 140 LBS | HEART RATE: 104 BPM | BODY MASS INDEX: 23.32 KG/M2 | HEIGHT: 65 IN | TEMPERATURE: 98.3 F | OXYGEN SATURATION: 95 % | RESPIRATION RATE: 18 BRPM

## 2022-10-15 DIAGNOSIS — J06.9 VIRAL UPPER RESPIRATORY ILLNESS: ICD-10-CM

## 2022-10-15 DIAGNOSIS — R05.1 ACUTE COUGH: Primary | ICD-10-CM

## 2022-10-15 PROCEDURE — 99203 OFFICE O/P NEW LOW 30 MIN: CPT

## 2022-10-15 PROCEDURE — 0241U HB NFCT DS VIR RESP RNA 4 TRGT: CPT

## 2022-10-15 PROCEDURE — S9083 URGENT CARE CENTER GLOBAL: HCPCS

## 2022-10-15 RX ORDER — GUAIFENESIN AND CODEINE PHOSPHATE 100; 10 MG/5ML; MG/5ML
5 SOLUTION ORAL
Qty: 50 ML | Refills: 0 | Status: SHIPPED | OUTPATIENT
Start: 2022-10-15

## 2022-10-15 RX ORDER — LATANOPROST 50 UG/ML
SOLUTION/ DROPS OPHTHALMIC
COMMUNITY
Start: 2022-08-11

## 2022-10-15 RX ORDER — PREDNISONE 20 MG/1
20 TABLET ORAL 2 TIMES DAILY WITH MEALS
Qty: 10 TABLET | Refills: 0 | Status: SHIPPED | OUTPATIENT
Start: 2022-10-15 | End: 2022-10-20

## 2022-10-15 RX ORDER — ALBUTEROL SULFATE 90 UG/1
2 AEROSOL, METERED RESPIRATORY (INHALATION) EVERY 6 HOURS PRN
Qty: 8.5 G | Refills: 0 | Status: SHIPPED | OUTPATIENT
Start: 2022-10-15

## 2022-10-15 NOTE — PROGRESS NOTES
[facility]        NAME: Maria Luisa Beckford is a 77 y o  female  : 1945    MRN: [id number]  DATE: October 15, 2022  TIME: 1:10 PM    Assessment and Plan   Acute cough [R05 1]  1  Acute cough  predniSONE 20 mg tablet    albuterol (ProAir HFA) 90 mcg/act inhaler    guaifenesin-codeine (GUAIFENESIN AC) 100-10 MG/5ML liquid    COVID/FLU/RSV   2  Viral upper respiratory illness  predniSONE 20 mg tablet    albuterol (ProAir HFA) 90 mcg/act inhaler    guaifenesin-codeine (GUAIFENESIN AC) 100-10 MG/5ML liquid     PCR COVID/flu/RSV collected today  Patient Instructions     Check my chart for COVID/flu/RSV results  Fluids and rest   Tylenol/Ibuprofen for pain/fever  Prednisone as directed  Inhaler as needed  Cough medication at bedtime as needed  Follow up with PCP next week for a re check of your lungs  Proceed to the ER with worsening symptoms  Chief Complaint     Chief Complaint   Patient presents with   • Cough     Chest congestion, started Tuesday, several home covid test were all negative          History of Present Illness       The patient presents today with complaints of cough and chest congestion that started on Wed  She states that on Tue she started with nasal congestion, but by the next day, the congestion moved into her chest  She denies history of former/current smoking, or history of asthma  She does have a history of bronchitis  She tool several home COVID tests all of which have been negative  She took OTC sudafed and delsym with minimal relief  She states she has taken tessalon pearls in the past which also do not work for her  She denies SOB, fevers/chills  She has a well check appointment with her PCP on Wed of this week  Review of Systems   Review of Systems   Constitutional: Negative for chills, fatigue and fever  HENT: Positive for congestion  Negative for ear pain, sinus pressure, sinus pain and sore throat  Eyes: Negative      Respiratory: Positive for cough (dry np) and chest tightness  Negative for shortness of breath and wheezing  Cardiovascular: Negative for chest pain and palpitations  Gastrointestinal: Negative for abdominal pain, diarrhea, nausea and vomiting  Genitourinary: Negative for difficulty urinating  Musculoskeletal: Negative for myalgias  Skin: Negative for rash  Allergic/Immunologic: Negative for environmental allergies  Neurological: Negative for dizziness and headaches  Psychiatric/Behavioral: Negative            Current Medications       Current Outpatient Medications:   •  albuterol (ProAir HFA) 90 mcg/act inhaler, Inhale 2 puffs every 6 (six) hours as needed for wheezing, Disp: 8 5 g, Rfl: 0  •  alendronate (FOSAMAX) 70 mg tablet, Take 70 mg by mouth every 7 days, Disp: , Rfl:   •  Calcium 600-200 MG-UNIT per tablet, Take 1 tablet by mouth 2 (two) times a day, Disp: , Rfl:   •  guaifenesin-codeine (GUAIFENESIN AC) 100-10 MG/5ML liquid, Take 5 mL by mouth daily at bedtime as needed for cough, Disp: 50 mL, Rfl: 0  •  latanoprost (XALATAN) 0 005 % ophthalmic solution, INSTILL 1 DROP INTO BOTH EYES IN THE EVENING, Disp: , Rfl:   •  predniSONE 20 mg tablet, Take 1 tablet (20 mg total) by mouth 2 (two) times a day with meals for 5 days, Disp: 10 tablet, Rfl: 0  •  albuterol (PROVENTIL HFA,VENTOLIN HFA) 90 mcg/act inhaler, Inhale 2 puffs 4 (four) times a day (Patient not taking: Reported on 6/17/2019), Disp: 8 g, Rfl: 0  •  Calcium Carb-Cholecalciferol (OSCAL-D) 500 mg-200 units per tablet, Take 1 tablet by mouth 2 (two) times a day with meals 600 calcium, Disp: , Rfl:   •  naproxen (NAPROSYN) 500 mg tablet, Take 1 tablet (500 mg total) by mouth 2 (two) times a day with meals (Patient not taking: Reported on 8/26/2021), Disp: 30 tablet, Rfl: 0  •  zolpidem (AMBIEN) 5 mg tablet, Take 1 tablet by mouth daily (Patient not taking: Reported on 8/26/2021), Disp: , Rfl:     Current Allergies     Allergies as of 10/15/2022   • (No Known Allergies)            The following portions of the patient's history were reviewed and updated as appropriate: allergies, current medications, past family history, past medical history, past social history, past surgical history and problem list      Past Medical History:   Diagnosis Date   • Anxiety    • History of bronchitis    • Non Hodgkin's lymphoma ([facility] [address] )     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 ->3/17/2016 -> Had chemo -> 3 doses and radiation about 17    • Non-Hodgkin lymphoma ([facility] [address] )    • Sleep disorder        Past Surgical History:   Procedure Laterality Date   • BLADDER REPAIR     • HYSTERECTOMY     • TONSILLECTOMY     • TRIGGER FINGER RELEASE  01/08/2020    right middle finger       Family History   Problem Relation Age of Onset   • Ovarian cancer Mother    • Heart attack Father          Medications have been verified  Objective   Pulse 104   Temp 98 3 °F (36 8 °C) (Temporal)   Resp 18   Ht 5' 5" (1 651 m)   Wt 63 5 kg (140 lb)   SpO2 95%   BMI 23 30 kg/m²        Physical Exam     Physical Exam  Vitals and nursing note reviewed  Constitutional:       General: She is not in acute distress  Appearance: Normal appearance  She is not ill-appearing  HENT:      Head: Normocephalic and atraumatic  Right Ear: Tympanic membrane, ear canal and external ear normal       Left Ear: Tympanic membrane, ear canal and external ear normal       Nose: Nose normal  No congestion  Mouth/Throat:      Lips: Pink  Mouth: Mucous membranes are moist       Pharynx: Oropharynx is clear  No oropharyngeal exudate or posterior oropharyngeal erythema  Tonsils: No tonsillar exudate  Eyes:      General: Vision grossly intact  Extraocular Movements: Extraocular movements intact  Pupils: Pupils are equal, round, and reactive to light  Cardiovascular:      Rate and Rhythm: Normal rate and regular rhythm  Heart sounds: Normal heart sounds   No murmur heard   Pulmonary:      Effort: Pulmonary effort is normal       Breath sounds: Normal breath sounds  No decreased breath sounds, wheezing, rhonchi or rales  Abdominal:      General: Abdomen is flat  Bowel sounds are normal       Palpations: Abdomen is soft  Musculoskeletal:         General: Normal range of motion  Cervical back: Normal range of motion  Skin:     General: Skin is warm  Findings: No rash  Neurological:      Mental Status: She is alert and oriented to person, place, and time  Motor: Motor function is intact     Psychiatric:         Attention and Perception: Attention normal          Mood and Affect: Mood normal

## 2022-10-15 NOTE — PATIENT INSTRUCTIONS
Check my chart for COVID/flu/RSV results  Fluids and rest   Tylenol/Ibuprofen for pain/fever  Prednisone as directed  Inhaler as needed  Cough medication at bedtime as needed  Follow up with PCP next week for a re check of your lungs  Proceed to the ER with worsening symptoms

## 2022-10-16 LAB
FLUAV RNA RESP QL NAA+PROBE: NEGATIVE
FLUBV RNA RESP QL NAA+PROBE: NEGATIVE
RSV RNA RESP QL NAA+PROBE: NEGATIVE
SARS-COV-2 RNA RESP QL NAA+PROBE: NEGATIVE

## 2023-05-24 ENCOUNTER — OFFICE VISIT (OUTPATIENT)
Dept: OBGYN CLINIC | Facility: MEDICAL CENTER | Age: 78
End: 2023-05-24

## 2023-05-24 VITALS
HEART RATE: 99 BPM | HEIGHT: 65 IN | DIASTOLIC BLOOD PRESSURE: 79 MMHG | WEIGHT: 145 LBS | SYSTOLIC BLOOD PRESSURE: 186 MMHG | BODY MASS INDEX: 24.16 KG/M2

## 2023-05-24 DIAGNOSIS — M51.36 LUMBAR DEGENERATIVE DISC DISEASE: Primary | ICD-10-CM

## 2023-05-24 PROBLEM — M51.369 LUMBAR DEGENERATIVE DISC DISEASE: Status: ACTIVE | Noted: 2023-05-24

## 2023-05-24 RX ORDER — NAPROXEN 500 MG/1
500 TABLET ORAL 2 TIMES DAILY PRN
Qty: 60 TABLET | Refills: 0 | Status: SHIPPED | OUTPATIENT
Start: 2023-05-24

## 2023-05-24 NOTE — PATIENT INSTRUCTIONS
Room temperature/warm soaks with epsom salt can help with muscle tightness/cramping  Epsom salt releases magnesium which can be helpful  Over-the-counter salonpas patches can be applied to the area of discomfort to help with pain  There are two types of patches; one contains lidocaine 4% and the other contains menthol (and sometimes camphor and methyl salicylate)  You can try one or the other and see which one works best for you  You can obtain diclofenac cream/gel/ointment over the counter  Many brands make this medication and they include Voltaren, Aspercreme and Aleve  You can use this up to 3 times per day  It is best to wash the area with water, pat dry and then apply  The cream absorbs better after this  Be careful not to let any pets or children get in contact with the medication as it can be harmful to them  If you develop a skin reaction, stop using the cream      You will be starting physical therapy  It is important to do home exercises as given by your physical therapist as you go through PT to speed up your recovery  Physical therapy addresses the problems that are causing your pain, instead of covering them up, as some other treatment options do  Rules for limiting activity by pain symptoms:      -You can work through some pain, but keep it at a level from which you are distractible  Pain should not exceed 3/10 in severity    -Do not change your biomechanics / form with your activity  This can lead to further injury    -If you pay for your activity later with substantial symptom exacerbation, you are not yet ready for that level of exertion

## 2023-05-24 NOTE — PROGRESS NOTES
"1  Lumbar degenerative disc disease  Ambulatory referral to Physical Therapy    naproxen (NAPROSYN) 500 mg tablet        Orders Placed This Encounter   Procedures   • Ambulatory referral to Physical Therapy     Impression:  Lumbar pain that is multifactorial and predominantly due to left sacroiliac joint dysfunction with lumbar degenerative disc disease  There are no concerning neurological deficits  We discussed different treatment options and decided to proceed with prescription strength naproxen, OTC topical diclofenac and OTC menthol or lidocaine patches  The patient should start physical therapy  I will see them back after 6 weeks of physical therapy or earlier if symptoms worsen/change  No follow-ups on file  Patient is in agreement with the above plan  Imaging Studies (I personally reviewed images in PACS and report if it was available):  Lumbar spine x-rays that are most recent to this encounter were reviewed  \"Bones are diffusely demineralized  Severe L4-5 degenerative disc disease with disc space narrowing, discal   calcification and subchondral sclerosis  Mild multilevel degenerative changes seen throughout the rest of the lumbosacral   spine with trace retrolisthesis of L1 on L2, L2 on L3 and L5 on S1  No acute fracture  Aortic calcifications and partially visualized left hip osteoarthritis  \"    Actual images are not available to me  HPI:  Toshia Horn is a 69 y o  female  who presents for evaluation of   Chief Complaint   Patient presents with   • Spine - Pain     Onset/Mechanism: Chronic pain for many years  Location: Across the low back, mostly in the left low back  Radiation: Into the mid-back  Currently in hip and lower buttocks on the left  Quality: Like muscle spasms  Provocative: Sleeping on that side  Severity: Aching and throbbing  Associated Symptoms: As above  Treatment so far: Over the past 6 months she has had exercise classes      No red flag " "symptoms including fever/chills, unintentional weight change, bowel/bladder incontinence, scissoring gait, personal/family history of cancer, pain worse at night, intravenous drug abuse or trauma  Following history reviewed and updated:  Past Medical History:   Diagnosis Date   • Anxiety    • History of bronchitis    • [facility] )     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 ->3/17/2016 -> Had chemo -> 3 doses and radiation about 17    • Non-Hodgkin lymphoma ([facility] )    • Sleep disorder      Past Surgical History:   Procedure Laterality Date   • BLADDER REPAIR     • HYSTERECTOMY     • TONSILLECTOMY     • TRIGGER FINGER RELEASE  01/08/2020    right middle finger     Social History   Social History     Substance and Sexual Activity   Alcohol Use Yes    Comment: socially     Social History     Substance and Sexual Activity   Drug Use No     Social History     Tobacco Use   Smoking Status Never   Smokeless Tobacco Never     Family History   Problem Relation Age of Onset   • Ovarian cancer Mother    • Heart attack Father      No Known Allergies     Constitutional:  BP (!) 186/79   Pulse 99   Ht 5' 5\" (1 651 m)   Wt 65 8 kg (145 lb)   BMI 24 13 kg/m²    General: NAD  Eyes: Anicteric sclerae  Neck: Supple  Lungs: Unlabored breathing  Cardiovascular: No lower extremity edema  Skin: Intact without erythema  Neurologic: Sensation intact to light touch  Psychiatric: Mood and affect are appropriate  Orthopedic Examination  • Inspection: No obvious deformities, lesions or rashes  • ROM: Limited most with extension at lumbar spine  • Palpation: Exquisitely tender at the left SIJ, left piriformis and left gluteal musculature  There are no step offs  • Neuro: Bilateral extremity strength is normal and symmetric  No muscle atrophy or abnormal tone  Bilateral extremity muscle stretch reflexes are physiologic and symmetric   No myelopathic signs     Sensation to light touch is " intact throughout  Neural compression testing: Normal bilateral SLR/dural stretch  Positive bilateral San's maneuver      Gait is normal     Procedures

## 2023-05-31 NOTE — PROGRESS NOTES
PT Evaluation     Today's date: 2023  Patient name: Jacky Hendrickson  : 1945  MRN: [id number]  Referring provider: Russell Putnam DO  Dx:   Encounter Diagnosis     ICD-10-CM    1  Lumbar degenerative disc disease  M51 36                      Assessment  Assessment details: Jacky Hendrickson is a 69 y o  female referred with primary diagnosis of Lumbar degenerative disc disease  (primary encounter diagnosis)   Patient presents with the following functional limitations: Pain with standing and walking, cooking meals and doing dishes  Patient demonstrated directional preference for lumbar extension  Pain into left LE greatly reduced with standing repeated extension and was abolished with long axis traction  Other lumbar ROM and core strength was not assessed due to patient having an incision on her upper back from having a basal cell carcinoma removed today  Treatment to include: Manual therapy techniques, extremity/core strengthening, neuromuscular control exercises,  instruction in a comprehensive HEP, and modalities as needed  They will benefit from skilled PT services to address the above functional deficits and to decrease pain to promote a return to their premorbid level of function  Impairments: abnormal or restricted ROM, abnormal movement, activity intolerance, impaired physical strength, lacks appropriate home exercise program, pain with function and poor posture   Functional limitations: Pain with standing and walking, cooking meals and doing dishes  Understanding of Dx/Px/POC: good   Prognosis: good  Prognosis details: Chronic back pain    Goals  STG (4 weeks)  1  Patient will demonstrate the ability to correct posture with minimal VC's  2  Patient will demonstrate 25% gains in lumbar ROM in all deficient planes  3  Patient will report pain as a 4-5/10 at worst with all normal activities  4  Patient will report 50% reduction in sleep disturbances related to pain  LTG (8 weeks)  1   Patient "will report pain as a 0-1/10 at worst with normal activities  2  Patient will sleep through the night without disturbances related to pain  3  Patient will demonstrate lumber ROM WFL to improve functional mobility  4  Patient will demonstrate core strength WFL to improve posture and restore normal joint kinematics  5  Patient will be independent and compliant with a HEP in order to maintain gains made with skilled PT services  Plan  Patient would benefit from: skilled physical therapy  Planned modality interventions: cryotherapy and thermotherapy: hydrocollator packs  Planned therapy interventions: abdominal trunk stabilization, joint mobilization, manual therapy, neuromuscular re-education, patient education, postural training, strengthening, stretching, therapeutic activities, therapeutic exercise, body mechanics training and home exercise program  Frequency: 2x week  Duration in weeks: 8  Plan of Care beginning date: 2023  Plan of Care expiration date: 2023  Treatment plan discussed with: patient        Subjective Evaluation    History of Present Illness  Mechanism of injury: Patient states she has been having left buttock and left LE pain for the past 4 weeks  Saw her PCP and was referred to orthopedics  The Orthopedist ordered x-rays  L-spine x-rays show, \"Severe L4-5 degenerative disc disease with disc space narrowing, discal calcification and subchondral sclerosis  Mild multilevel degenerative changes seen throughout the rest of the lumbosacral spine with trace retrolisthesis of L1 on L2, L2 on L3 and L5 on S1 \"  She is referred now to outpatient PT services  Pain  Current pain ratin  At best pain ratin  At worst pain rating: 10  Location: Left buttock and entire left LE to foot  Quality: dull ache, sharp and knife-like  Aggravating factors: stair climbing, standing and walking      Diagnostic Tests  X-ray: abnormal    FCE comments: (-) sleep disturbances    Goes up stairs step-to " "and down stairs reciprocally  (-) saddle anesthesia, bowel/bladder changes, LE paresthesias   (+) left LE weakness  Not currently walking or exercising because of pain  Doing fewer things around the house because of pain  Currently feels like she is functioning at 60% of her normal function because she \"pushes\" herself  Treatments  Previous treatment: medication  Current treatment: medication  Patient Goals  Patient goals for therapy: increased strength, independence with ADLs/IADLs and return to sport/leisure activities  Patient goal: Resume her prior level of function  Objective     Concurrent Complaints  Positive for history of cancer  Negative for night pain, disturbed sleep, bladder dysfunction, bowel dysfunction, saddle (S4) numbness and history of trauma    Postural Observations  Seated posture: good  Standing posture: good  Correction of posture: makes symptoms better        Palpation   Left   No palpable tenderness to the erector spinae, lumbar paraspinals and quadratus lumborum  Right   No palpable tenderness to the lumbar paraspinals and quadratus lumborum  Additional Palpation Details  (-) TTP left piriformis    Tenderness     Lumbar Spine  No tenderness in the spinous process and facet joint  Left Hip   Tenderness in the PSIS  Neurological Testing     Sensation     Lumbar   Left   Intact: light touch    Right   Intact: light touch    Reflexes   Left   Patellar (L4): normal (2+)  Achilles (S1): normal (2+)    Right   Patellar (L4): normal (2+)  Achilles (S1): normal (2+)    Active Range of Motion     Lumbar   Extension:  Restriction level: minimal    Additional Active Range of Motion Details  Did not assess lumbar ROM due to incision from removal of basal cell carcinoma today      Joint Play   Joints within functional limits: L1, L2, L3, L4 and L5   Mechanical Assessment    Cervical      Thoracic      Lumbar    Standing extension: repeated movements  Pain location: " centralized  Pain intensity: better  Pain level: decreased    Strength/Myotome Testing     Lumbar   Left   Normal strength    Right   Normal strength    Tests     Lumbar     Left   Positive slump test      Left Hip   Negative REX and FADIR  Right Hip   Negative REX and FADIR  Ambulation   Weight-Bearing Status   Weight-Bearing Status (Left): full weight bearing     Ambulation: Stairs   Ascend stairs: independent  Pattern: non-reciprocal  Railings: one rail  Descend stairs: independent  Pattern: reciprocal  Railings: one rail    Observational Gait   Gait: antalgic              Precautions: Anxiety, Non-Hodgkin's Lymphoma  Avoid upper back strengthening and use of bilateral UE x 2 weeks until physician follow up to assess healing of upper back incision on 6/13      Manuals 6/1            Left sciatic nerve glides and tensioners JF            Left piriformis pin and stretch JF            Left LAD                          Neuro Re-Ed             Pt education Centralization vs  peripheralization  SI dysfunction    Disc derangement            PPT             PPT with march             Supine t-band clamshells             SL clamshells esperanza                                       Ther Ex             Nustep             Seated left sciatic nerve glides Instructed            Left piriformis stretch             JENNIFER 2x10 at table                                                                Ther Activity                                       Gait Training                                       Modalities

## 2023-06-01 ENCOUNTER — EVALUATION (OUTPATIENT)
Dept: PHYSICAL THERAPY | Facility: CLINIC | Age: 78
End: 2023-06-01

## 2023-06-01 DIAGNOSIS — M51.36 LUMBAR DEGENERATIVE DISC DISEASE: Primary | ICD-10-CM

## 2023-06-06 ENCOUNTER — OFFICE VISIT (OUTPATIENT)
Dept: PHYSICAL THERAPY | Facility: CLINIC | Age: 78
End: 2023-06-06
Payer: COMMERCIAL

## 2023-06-06 DIAGNOSIS — M51.36 LUMBAR DEGENERATIVE DISC DISEASE: Primary | ICD-10-CM

## 2023-06-06 PROCEDURE — 97112 NEUROMUSCULAR REEDUCATION: CPT

## 2023-06-06 PROCEDURE — 97110 THERAPEUTIC EXERCISES: CPT

## 2023-06-06 NOTE — PROGRESS NOTES
"Daily Note     Today's date: 2023  Patient name: Benito Pugh  : 1945  MRN: [id number]  Referring provider: Rick Cui DO  Dx:   Encounter Diagnosis     ICD-10-CM    1  Lumbar degenerative disc disease  M51 36                      Subjective: Patient states her symptoms are radiating into her L glute at start of session  Objective: See treatment diary below      Assessment: Initiated POC as charted below with good tolerance  She has increased sx into L glute in weight bearing and her sx decrease when unloaded  Abolished sx with LAD  However sx slightly increase in hooklying position  Prone glute set added for glute/lumbar strengthening  Trial of SO with favorable response  TTP and restriction noted on L glute She centralizes with standing ext  Discussed performing lumbar ext as symptoms arise and use of towel roll when seated to maintain ext and support lumbar  Patient demonstrated fatigue post treatment and would benefit from continued PT      Plan: Progress treatment as tolerated  Precautions: Anxiety, Non-Hodgkin's Lymphoma  Avoid upper back strengthening and use of bilateral UE x 2 weeks until physician follow up to assess healing of upper back incision on       Manuals            Left sciatic nerve glides and tensioners JF self           Left piriformis pin and stretch JF BROOKE            Left LAD  BROOKE                        Neuro Re-Ed             Pt education Centralization vs  peripheralization  SI dysfunction    Disc derangement Centralization vs peripheralization   Use of towel roll when seated           PPT  5\" 2x10           PPT with march             Supine t-band clamshells             SL clamshells esperanza             SO  3 min            Prone glute set  5\" 2x10           Ther Ex             Nustep  6'  L3           Seated left sciatic nerve glides Instructed Supine glides 15x   Seated tensioners 15x            Left piriformis stretch             JENNIFER 2x10 at " table 2x10 at table                                                                Ther Activity                                       Gait Training                                       Modalities

## 2023-06-08 ENCOUNTER — OFFICE VISIT (OUTPATIENT)
Dept: PHYSICAL THERAPY | Facility: CLINIC | Age: 78
End: 2023-06-08
Payer: COMMERCIAL

## 2023-06-08 DIAGNOSIS — M51.36 LUMBAR DEGENERATIVE DISC DISEASE: Primary | ICD-10-CM

## 2023-06-08 PROCEDURE — 97110 THERAPEUTIC EXERCISES: CPT

## 2023-06-08 PROCEDURE — 97112 NEUROMUSCULAR REEDUCATION: CPT

## 2023-06-08 NOTE — PROGRESS NOTES
"Daily Note     Today's date: 2023  Patient name: Jenny Mahoney  : 1945  MRN: [id number]  Referring provider: Sheba Curiel DO  Dx:   Encounter Diagnosis     ICD-10-CM    1  Lumbar degenerative disc disease  M51 36                      Subjective: Pt notes she felt fairly well after last session  Denies nay worsening of symptoms above her baseline  Notes she is on her feet more later in the day which tends to produce more pain/symptoms for her  Objective: See treatment diary below      Assessment: Tolerated treatment well  Patient demonstrated fatigue post treatment and exhibited good technique with therapeutic exercises  Addition of piriformis stretch in effort to reduce symptoms  Addition of PPT+march for gentle core activation  Good tolerance throughout  Good response to extension-based motions/positionins as evident by no reported LE symptoms upon conclusion of session  Pt was encouraged to continue with HEP and repeated extension at home  Plan: Continue per plan of care  Progress treatment as tolerated  Precautions: Anxiety, Non-Hodgkin's Lymphoma  Avoid upper back strengthening and use of bilateral UE x 2 weeks until physician follow up to assess healing of upper back incision on       Manuals           Left sciatic nerve glides and tensioners JF self MS          Left piriformis pin and stretch JF BROOKE            Left LAD  BROOKE MS                       Neuro Re-Ed             Pt education Centralization vs  peripheralization  SI dysfunction    Disc derangement Centralization vs peripheralization   Use of towel roll when seated           PPT  5\" 2x10 5\"x20          PPT with march   x10 ea          Supine t-band clamshells             SL clamshells esperanza             SO  3 min  x3 min          Prone glute set  5\" 2x10 5\" x20          Ther Ex             Nustep  6'  L3 rec bike x8' for ROM          Seated left sciatic nerve glides Instructed Supine glides 15x   Seated " "tensioners 15x            Left piriformis stretch   30\"x3          JENNIFER 2x10 at table 2x10 at table  2x10 vs table                                                              Ther Activity                                       Gait Training                                       Modalities                                            "

## 2023-06-13 ENCOUNTER — OFFICE VISIT (OUTPATIENT)
Dept: PHYSICAL THERAPY | Facility: CLINIC | Age: 78
End: 2023-06-13
Payer: COMMERCIAL

## 2023-06-13 DIAGNOSIS — M51.36 LUMBAR DEGENERATIVE DISC DISEASE: Primary | ICD-10-CM

## 2023-06-13 PROCEDURE — 97140 MANUAL THERAPY 1/> REGIONS: CPT

## 2023-06-13 PROCEDURE — 97110 THERAPEUTIC EXERCISES: CPT

## 2023-06-13 PROCEDURE — 97112 NEUROMUSCULAR REEDUCATION: CPT

## 2023-06-13 NOTE — PROGRESS NOTES
"Daily Note     Today's date: 2023  Patient name: Gustabo Dahl  : 1945  MRN: [id number]  Referring provider: Bijal Lopez DO  Dx:   Encounter Diagnosis     ICD-10-CM    1  Lumbar degenerative disc disease  M51 36           Start Time: 3286  Stop Time: 152  Total time in clinic (min): 40 minutes    Subjective: Pt noted that she walks has pain but noted that she does see some improvement  Pt noted that is it painful when she stand up after sitting for prolonged times  Also noted pain while walking prolonged distances  P! Located on L buttocks and LB  Objective: See treatment diary below      Assessment: Continued with treatment session focus on LB p! As well as buttock  Tolerated treatment well  Advised patient in log rolling technique at this time s/p performing some prone press ups to maintain a proper upright spine  Patient exhibited good technique with therapeutic exercises and would benefit from continued PT  S/p treatment session pt noted having less pain but still having soreness and pain in her L buttocks  Plan: Continue per plan of care  Precautions: Anxiety, Non-Hodgkin's Lymphoma  Avoid upper back strengthening and use of bilateral UE x 2 weeks until physician follow up to assess healing of upper back incision on       Manuals          Left sciatic nerve glides and tensioners JF self MS          Left piriformis pin and stretch JF BROOKE            Left LAD  BROOKE MS          Overpressure  With prone press ups     SC         piriformis STM roller     SC         Neuro Re-Ed             Pt education Centralization vs  peripheralization  SI dysfunction    Disc derangement Centralization vs peripheralization   Use of towel roll when seated  HEP and DOMS          PPT  5\" 2x10 5\"x20          PPT with  ea          Supine t-band clamshells             SL clamshells esperanza             SO  3 min  x3 min  1 min          Prone press ups     2x 10          Prone " "glute set  5\" 2x10 5\" x20                                                              Ther Ex             Nustep  6'  L3 rec bike x8' for ROM nustep L6 10 min         Seated left sciatic nerve glides Instructed Supine glides 15x   Seated tensioners 15x            Left piriformis stretch   30\"x3          JENNIFER 2x10 at table 2x10 at table  2x10 vs table 2x 10          Prone press ups                                                     Ther Activity                                       Gait Training                                       Modalities                                            "

## 2023-06-15 ENCOUNTER — OFFICE VISIT (OUTPATIENT)
Dept: PHYSICAL THERAPY | Facility: CLINIC | Age: 78
End: 2023-06-15
Payer: COMMERCIAL

## 2023-06-15 DIAGNOSIS — M51.36 LUMBAR DEGENERATIVE DISC DISEASE: Primary | ICD-10-CM

## 2023-06-15 PROCEDURE — 97112 NEUROMUSCULAR REEDUCATION: CPT

## 2023-06-15 PROCEDURE — 97140 MANUAL THERAPY 1/> REGIONS: CPT

## 2023-06-15 NOTE — PROGRESS NOTES
"Daily Note     Today's date: 6/15/2023  Patient name: Gustabo Dahl  : 1945  MRN: [id number]  Referring provider: Bijal Lopez DO  Dx:   Encounter Diagnosis     ICD-10-CM    1  Lumbar degenerative disc disease  M51 36                      Subjective: Patient states she thinks she is feeling a bit better  Objective: See treatment diary below      Assessment: Tolerated treatment well  Cont with ext biased program with no issues noted  She is TTP L lateral to SIJ upper glute  Roller and STM provide relief  Added clamshells with no pain or discomfort  Patient demonstrated fatigue post treatment and would benefit from continued PT      Plan: Progress treatment as tolerated  Precautions: Anxiety, Non-Hodgkin's Lymphoma  Avoid upper back strengthening and use of bilateral UE x 2 weeks until physician follow up to assess healing of upper back incision on       Manuals 6/1 6/6 6/8 6/13 6/15        Left sciatic nerve glides and tensioners JF self MS          Left piriformis pin and stretch JF BROOKE            Left LAD  BROOKE MS          Overpressure  With prone press ups     SC BROOKE        piriformis STM roller     SC BROOKE         Neuro Re-Ed             Pt education Centralization vs  peripheralization  SI dysfunction    Disc derangement Centralization vs peripheralization   Use of towel roll when seated  HEP and DOMS          PPT  5\" 2x10 5\"x20  5\"x20        PPT with march   x10 ea  x15 ea        Supine t-band clamshells             SL clamshells esperanza     2x10 3\" ea        SO  3 min  x3 min  1 min  2 min         Prone press ups     2x 10  3x5   1x5 c/ OP        Prone glute set  5\" 2x10 5\" x20          Prone hip ext     10x B                                                Ther Ex             Nustep  6'  L3 rec bike x8' for ROM nustep L6 10 min         Seated left sciatic nerve glides Instructed Supine glides 15x   Seated tensioners 15x            Left piriformis stretch   30\"x3  20\"x4         JENNIFER 2x10 at " table 2x10 at table  2x10 vs table 2x 10  2x10                                                             Ther Activity                                       Gait Training                                       Modalities

## 2023-06-20 ENCOUNTER — OFFICE VISIT (OUTPATIENT)
Dept: PHYSICAL THERAPY | Facility: CLINIC | Age: 78
End: 2023-06-20
Payer: COMMERCIAL

## 2023-06-20 DIAGNOSIS — M51.36 LUMBAR DEGENERATIVE DISC DISEASE: Primary | ICD-10-CM

## 2023-06-20 PROCEDURE — 97110 THERAPEUTIC EXERCISES: CPT

## 2023-06-20 PROCEDURE — 97112 NEUROMUSCULAR REEDUCATION: CPT

## 2023-06-20 PROCEDURE — 97140 MANUAL THERAPY 1/> REGIONS: CPT

## 2023-06-20 NOTE — PROGRESS NOTES
"Daily Note     Today's date: 2023  Patient name: Milo Rivas  : 1945  MRN: [id number]  Referring provider: Milady Yan DO  Dx:   Encounter Diagnosis     ICD-10-CM    1  Lumbar degenerative disc disease  M51 36           Start Time: 1130  Stop Time: 1218  Total time in clinic (min): 48 minutes    Subjective: Pt noted that she has been seeing improvements  Objective: See treatment diary below      Assessment: Continued with treatment session, Pt noted still having some relief with lumbar extension exercises  Tolerated treatment well  Patient exhibited good technique with therapeutic exercises and would benefit from continued PT  S/p treatment session, Overall patient able to complete all exercises with no increase in pain noted  Plan: Continue per plan of care  Precautions: Anxiety, Non-Hodgkin's Lymphoma  Avoid upper back strengthening and use of bilateral UE x 2 weeks until physician follow up to assess healing of upper back incision on       Manuals 6/1 6/6 6/8 6/13 6/15 6/20       Left sciatic nerve glides and tensioners JF self MS          Left piriformis pin and stretch JF BROOKE            Left LAD  BROOKE MS          Overpressure  With prone press ups     SC BROOKE        piriformis STM roller     SC BROOKE  SC       Neuro Re-Ed             Pt education Centralization vs  peripheralization  SI dysfunction  Disc derangement Centralization vs peripheralization   Use of towel roll when seated  HEP and DOMS          PPT  5\" 2x10 5\"x20  5\"x20        PPT with  ea  x15 ea        Supine t-band clamshells             SL clamshells esperanza     2x10 3\" ea        SO  3 min  x3 min  1 min  2 min  3 min        Prone press ups     2x 10  3x5   1x5 c/ OP 3x 10        Prone glute set  5\" 2x10 5\" x20   5\" 2x 10        Prone hip ext     10x B                                                Ther Ex             Nustep for cardio/ endurance     6'  L3 rec bike x8' for ROM nustep L6 10 min  nustep " "10 min L6        Seated left sciatic nerve glides Instructed Supine glides 15x   Seated tensioners 15x            Left piriformis stretch   30\"x3  20\"x4  30\"x 3        JENNIFER 2x10 at table 2x10 at table  2x10 vs table 2x 10  2x10                                                             Ther Activity                                       Gait Training                                       Modalities                                            "

## 2023-06-21 NOTE — PROGRESS NOTES
"Daily Note     Today's date: 2023  Patient name: Genaro Epstein  : 1945  MRN: [id number]  Referring provider: Lesly Waldrop DO  Dx:   Encounter Diagnosis     ICD-10-CM    1  Lumbar degenerative disc disease  M51 36                      Subjective: Patient states her hip is \"killing me today  \"  Had been feeling better, but awoke today with significantly increased hip pain  Denies precipitating event yesterday  Objective: See treatment diary below      Assessment: Tolerated treatment well  Patient would benefit from continued PT  Patient reports feeling significantly better after treatment today  Advised patient to perform press-ups or JENNIFER as needed for pain relief  Plan: Continue per plan of care  Precautions: Anxiety, Non-Hodgkin's Lymphoma  Avoid upper back strengthening and use of bilateral UE x 2 weeks until physician follow up to assess healing of upper back incision on       Manuals 6/1 6/6 6/8 6/13 6/15 6/20 6/22      Left sciatic nerve glides and tensioners JF self MS    JF      Left piriformis pin and stretch JF BROOKE            Left LAD  BROOKE MS          Overpressure  With prone press ups     SC BROOKE        piriformis STM roller     SC BROOKE  SC JF      Neuro Re-Ed             Pt education Centralization vs  peripheralization  SI dysfunction  Disc derangement Centralization vs peripheralization   Use of towel roll when seated  HEP and DOMS          PPT  5\" 2x10 5\"x20  5\"x20 5\"x20 5\"x20      PPT with march   x10 ea  x15 ea x20 x20      Supine t-band clamshells             SL clamshells esperanza     2x10 3\" ea        SO  3 min  x3 min  1 min  2 min  3 min        Prone press ups     2x 10  3x5   1x5 c/ OP 3x 10        Prone glute set  5\" 2x10 5\" x20   5\" 2x 10        Prone hip ext     10x B                                                Ther Ex             Nustep for cardio/ endurance     6'  L3 rec bike x8' for ROM nustep L6 10 min  nustep 10 min L6  nustep 10 min L6       Seated " "left sciatic nerve glides Instructed Supine glides 15x   Seated tensioners 15x            Left piriformis stretch   30\"x3  20\"x4  30\"x 3  30\"x 3       JENNIFER 2x10 at table 2x10 at table  2x10 vs table 2x 10  2x10                                                             Ther Activity                                       Gait Training                                       Modalities                                            "

## 2023-06-22 ENCOUNTER — OFFICE VISIT (OUTPATIENT)
Dept: PHYSICAL THERAPY | Facility: CLINIC | Age: 78
End: 2023-06-22
Payer: COMMERCIAL

## 2023-06-22 DIAGNOSIS — M51.36 LUMBAR DEGENERATIVE DISC DISEASE: Primary | ICD-10-CM

## 2023-06-22 PROCEDURE — 97110 THERAPEUTIC EXERCISES: CPT | Performed by: PHYSICAL THERAPIST

## 2023-06-22 PROCEDURE — 97140 MANUAL THERAPY 1/> REGIONS: CPT | Performed by: PHYSICAL THERAPIST

## 2023-06-22 PROCEDURE — 97112 NEUROMUSCULAR REEDUCATION: CPT | Performed by: PHYSICAL THERAPIST

## 2023-06-27 ENCOUNTER — OFFICE VISIT (OUTPATIENT)
Dept: PHYSICAL THERAPY | Facility: CLINIC | Age: 78
End: 2023-06-27
Payer: COMMERCIAL

## 2023-06-27 DIAGNOSIS — M51.36 LUMBAR DEGENERATIVE DISC DISEASE: Primary | ICD-10-CM

## 2023-06-27 PROCEDURE — 97110 THERAPEUTIC EXERCISES: CPT

## 2023-06-27 PROCEDURE — 97112 NEUROMUSCULAR REEDUCATION: CPT

## 2023-06-27 NOTE — PROGRESS NOTES
"Daily Note     Today's date: 2023  Patient name: Destiny Brian  : 1945  MRN: [id number]  Referring provider: Zechariah Cramer DO  Dx:   Encounter Diagnosis     ICD-10-CM    1  Lumbar degenerative disc disease  M51 36           Start Time:   Stop Time:   Total time in clinic (min): 35 minutes    Subjective: Pt noted that she was having some back spasms yesterday  Upon arrival Pt noted minimal pain in her LB and noted that she feels like it has been getting better  Noted no spasms as of today  Objective: See treatment diary below      Assessment: Continued with treatment session with focus on LB and core stabilization  Educated on benefit from repeated extension exercises even though not completed t/o todays treatment session  Tolerated treatment well  Patient exhibited good technique with therapeutic exercises and would benefit from continued PT  Pt noted no immediate changes s/p treatment session and still feeling \"good\"      Plan: Continue per plan of care  Precautions: Anxiety, Non-Hodgkin's Lymphoma  Avoid upper back strengthening and use of bilateral UE x 2 weeks until physician follow up to assess healing of upper back incision on       Manuals 6/1 6/6 6/8 6/13 6/15 6/20 6/22 6/27     Left sciatic nerve glides and tensioners JF self MS    JF SC     Left piriformis pin and stretch JF BROOKE            Left LAD  BROOKE MS          Overpressure  With prone press ups     SC BROOKE        piriformis STM roller     SC BROOKE  SC JF SC     Neuro Re-Ed             Pt education Centralization vs  peripheralization  SI dysfunction    Disc derangement Centralization vs peripheralization   Use of towel roll when seated  HEP and DOMS          PPT  5\" 2x10 5\"x20  5\"x20 5\"x20 5\"x20 5\" 40x      PPT with march   x10 ea  x15 ea x20 x20 30x      Supine t-band clamshells             SL clamshells esperanza     2x10 3\" ea        SO  3 min  x3 min  1 min  2 min  3 min   reviewed      Prone press ups     2x 10  3x5 " "  1x5 c/ OP 3x 10   reviewed      Prone glute set  5\" 2x10 5\" x20   5\" 2x 10        Prone hip ext     10x B                                                Ther Ex             Nustep for cardio/ endurance     6'  L3 rec bike x8' for ROM nustep L6 10 min  nustep 10 min L6  nustep 10 min L6  Nustep 10 min L6      Seated left sciatic nerve glides Instructed Supine glides 15x   Seated tensioners 15x            Left piriformis stretch   30\"x3  20\"x4  30\"x 3  30\"x 3  30\"x 3      JENNIFER 2x10 at table 2x10 at table  2x10 vs table 2x 10  2x10                                                             Ther Activity                                       Gait Training                                       Modalities                                            "

## 2023-06-29 ENCOUNTER — OFFICE VISIT (OUTPATIENT)
Dept: PHYSICAL THERAPY | Facility: CLINIC | Age: 78
End: 2023-06-29
Payer: COMMERCIAL

## 2023-06-29 DIAGNOSIS — M51.36 LUMBAR DEGENERATIVE DISC DISEASE: Primary | ICD-10-CM

## 2023-06-29 PROCEDURE — 97110 THERAPEUTIC EXERCISES: CPT | Performed by: PHYSICAL THERAPIST

## 2023-06-29 PROCEDURE — 97112 NEUROMUSCULAR REEDUCATION: CPT | Performed by: PHYSICAL THERAPIST

## 2023-06-29 NOTE — PROGRESS NOTES
"Daily Note     Today's date: 2023  Patient name: Bert Barahona  : 1945  MRN: [id number]  Referring provider: Jennifer Tao DO  Dx:   Encounter Diagnosis     ICD-10-CM    1  Lumbar degenerative disc disease  M51 36                      Subjective: Patient states today is a very good day  Not having pain today  Objective: See treatment diary below      Assessment: Tolerated treatment well  Patient would benefit from continued PT  Quality of gait greatly improved today  Added new TE today for core stabilization  Progressing well  Plan: Continue per plan of care  Precautions: Anxiety, Non-Hodgkin's Lymphoma  Avoid upper back strengthening and use of bilateral UE x 2 weeks until physician follow up to assess healing of upper back incision on       Manuals 6/1 6/6 6/8 6/13 6/15 6/20 6/22 6/27 6/29    Left sciatic nerve glides and tensioners JF self MS    JF SC     Left piriformis pin and stretch JF BROOKE            Left LAD  BROOKE MS          Overpressure  With prone press ups     SC BROOKE        piriformis STM roller     SC BROOKE  SC JF SC JF    Neuro Re-Ed             Pt education Centralization vs  peripheralization  SI dysfunction  Disc derangement Centralization vs peripheralization   Use of towel roll when seated  HEP and DOMS          PPT  5\" 2x10 5\"x20  5\"x20 5\"x20 5\"x20 5\" 40x      PPT with  ea  x15 ea x20 x20 30x  30x     Supine t-band clamshells         GTB 3\"x20 BTB   SL clamshells esperanza     2x10 3\" ea        SO  3 min  x3 min  1 min  2 min  3 min   reviewed      Prone press ups     2x 10  3x5   1x5 c/ OP 3x 10   reviewed      Prone glute set  5\" 2x10 5\" x20   5\" 2x 10        Prone hip ext     10x B     x20 ea    T-ball press in supine HL         5\"x20                              Ther Ex             Nustep for cardio/ endurance     6'  L3 rec bike x8' for ROM nustep L6 10 min  nustep 10 min L6  nustep 10 min L6  Nustep 10 min L6  Nustep 10 min L6     Seated left sciatic " "nerve glides Instructed Supine glides 15x   Seated tensioners 15x            Left piriformis stretch   30\"x3  20\"x4  30\"x 3  30\"x 3  30\"x 3  30\"x 3     JENNIFER 2x10 at table 2x10 at table  2x10 vs table 2x 10  2x10                                                             Ther Activity                                       Gait Training                                       Modalities                                            "

## 2023-07-06 ENCOUNTER — OFFICE VISIT (OUTPATIENT)
Dept: PHYSICAL THERAPY | Facility: CLINIC | Age: 78
End: 2023-07-06
Payer: COMMERCIAL

## 2023-07-06 DIAGNOSIS — M51.36 LUMBAR DEGENERATIVE DISC DISEASE: Primary | ICD-10-CM

## 2023-07-06 PROCEDURE — 97112 NEUROMUSCULAR REEDUCATION: CPT

## 2023-07-06 PROCEDURE — 97110 THERAPEUTIC EXERCISES: CPT

## 2023-07-06 NOTE — PROGRESS NOTES
Daily Note     Today's date: 2023  Patient name: Alex Richardson  : 1945  MRN: [id number]  Referring provider: Zeferino Cisneros DO  Dx:   Encounter Diagnosis     ICD-10-CM    1. Lumbar degenerative disc disease  M51.36           Start Time: 1130  Stop Time: 1215  Total time in clinic (min): 45 minutes    Subjective: Pt noted that she feels like a "good day" having some p!. Pt noted that she does feel like she has more good days verses bad days. Pt noted that she has been using a roller on her piriformis multiple times a day. Objective: See treatment diary below      Assessment: Continued with treatment session, minimal progressions completed this visit. Tolerated treatment well. Patient exhibited good technique with therapeutic exercises and would benefit from continued PT. S/P treatment session pt noted feeling good but some soreness in hips. Plan: Continue per plan of care. Precautions: Anxiety, Non-Hodgkin's Lymphoma. Avoid upper back strengthening and use of bilateral UE x 2 weeks until physician follow up to assess healing of upper back incision on       Manuals 6/1 6/6 6/8 6/13 6/15 6/20 6/22 6/27 6/29 7/5   Left sciatic nerve glides and tensioners JF self MS    JF SC     Left piriformis pin and stretch JF BROOKE            Left LAD  BROOKE MS          Overpressure  With prone press ups     SC BROOKE        piriformis STM roller     SC BROOKE  SC JF SC JF NV   Neuro Re-Ed             Pt education Centralization vs. peripheralization. SI dysfunction.   Disc derangement Centralization vs peripheralization   Use of towel roll when seated  HEP and DOMS          PPT  5" 2x10 5"x20  5"x20 5"x20 5"x20 5" 40x      PPT with march   x10 ea  x15 ea x20 x20 30x  30x     Supine t-band clamshells         GTB 3"x20 BTB NV   SL clamshells esperanza     2x10 3" ea     2x 10    SO  3 min  x3 min  1 min  2 min  3 min   reviewed      Prone press ups     2x 10  3x5   1x5 c/ OP 3x 10   reviewed      Prone glute set  5" 2x10 5" x20   5" 2x 10        Prone hip ext     10x B     x20 ea 20x    Prone hip ext and opp UE raises           10x ea. T-ball press in supine HL         5"x20                              Ther Ex             Nustep for cardio/ endurance.    6'  L3 rec bike x8' for ROM nustep L6 10 min  nustep 10 min L6  nustep 10 min L6  Nustep 10 min L6  Nustep 10 min L6  nustep 10 min    Seated left sciatic nerve glides Instructed Supine glides 15x   Seated tensioners 15x            Left piriformis stretch   30"x3  20"x4  30"x 3  30"x 3  30"x 3  30"x 3  30" x3    JENNIFER 2x10 at table 2x10 at table  2x10 vs table 2x 10  2x10                                                             Ther Activity                                       Gait Training                                       Modalities

## 2023-07-11 ENCOUNTER — EVALUATION (OUTPATIENT)
Dept: PHYSICAL THERAPY | Facility: CLINIC | Age: 78
End: 2023-07-11
Payer: COMMERCIAL

## 2023-07-11 DIAGNOSIS — M51.36 LUMBAR DEGENERATIVE DISC DISEASE: Primary | ICD-10-CM

## 2023-07-11 PROCEDURE — 97110 THERAPEUTIC EXERCISES: CPT | Performed by: PHYSICAL THERAPIST

## 2023-07-11 NOTE — PROGRESS NOTES
PT Discharge    Today's date: 2023  Patient name: Ryan Morales  : 1945  MRN: [id number]  Referring provider: Chalo Fong DO  Dx:   Encounter Diagnosis     ICD-10-CM    1. Lumbar degenerative disc disease  M51.36                      Assessment  Assessment details: Patient reports feeling 60% improved to date. While she still has some pain with transferring sit to stand, these symptoms resolve fairly quickly with movement and walking. Pain is now intermittent in nature, is of shorter duration, and a lesser intensity. Patient demonstrates lumbar ROM, and bilateral LE strength grossly WFL. She still has tenderness to palpation of her left PSIS and her left Piriformis  She is independent and compliant with her HEP. Patient wishes to DC PT services at this time and plans to continue her HEP and resume her community wellness program.      Impairments: abnormal or restricted ROM, abnormal movement, activity intolerance, impaired physical strength, lacks appropriate home exercise program, pain with function and poor posture   Functional limitations: Pain with standing and walking, cooking meals and doing dishes. Understanding of Dx/Px/POC: good   Prognosis: good  Prognosis details: Chronic back pain    Goals  STG (4 weeks)  1. Patient will demonstrate the ability to correct posture with minimal VC's - met  2. Patient will demonstrate 25% gains in lumbar ROM in all deficient planes- met  3. Patient will report pain as a 4-5/10 at worst with all normal activities- not met  4. Patient will report 50% reduction in sleep disturbances related to pain- met  LTG (8 weeks)  1. Patient will report pain as a 0-1/10 at worst with normal activities- not met  2. Patient will sleep through the night without disturbances related to pain- met  3. Patient will demonstrate lumber ROM WFL to improve functional mobility- met  4.  Patient will demonstrate core strength WFL to improve posture and restore normal joint kinematics - met  5. Patient will be independent and compliant with a HEP in order to maintain gains made with skilled PT services. - met      Plan  Planned therapy interventions: patient education and home exercise program  Duration in weeks: 1  Plan of Care beginning date: 2023  Plan of Care expiration date: 2023  Treatment plan discussed with: patient        Subjective Evaluation    History of Present Illness  Mechanism of injury: Patient reports feeling 60% improved since starting PT services. She still has some pain in her hip with standing after prolonged sitting. She also has some pain intermittently when lying on her side. Overall, pain is less intense, is no longer constant in nature, and is of shorter duration when she does have it. Feels better walking with a cart. Patient feels she is ready for DC to a HEP. Plans to continue with HEP and community wellness program      Patient Goals  Patient goals for therapy: increased strength, independence with ADLs/IADLs and return to sport/leisure activities  Patient goal: Resume her prior level of function. Pain  Current pain ratin  At best pain ratin  At worst pain ratin  Location: Left lower back  Quality: dull ache  Alleviating factors: sitting and lying. Aggravating factors: standing  Progression: improved    Social Support  Lives with: spouse      Diagnostic Tests  X-ray: abnormal    FCE comments: (-) sleep disturbances. Goes up stairs step-to and down stairs reciprocally  (-) saddle anesthesia, bowel/bladder changes, LE paresthesias. Treatments  Previous treatment: medication  Current treatment: medication        Objective     Concurrent Complaints  Positive for history of cancer.  Negative for night pain, disturbed sleep, bladder dysfunction, bowel dysfunction, saddle (S4) numbness and history of trauma    Postural Observations  Seated posture: good  Standing posture: good  Correction of posture: makes symptoms better        Palpation   Left   No palpable tenderness to the erector spinae, lumbar paraspinals and quadratus lumborum. Right   No palpable tenderness to the lumbar paraspinals and quadratus lumborum. Additional Palpation Details  (+) TTP left piriformis    Tenderness     Lumbar Spine  No tenderness in the spinous process and facet joint. Left Hip   Tenderness in the PSIS. Neurological Testing     Sensation     Lumbar   Left   Intact: light touch    Right   Intact: light touch    Reflexes   Left   Patellar (L4): normal (2+)  Achilles (S1): normal (2+)    Right   Patellar (L4): normal (2+)  Achilles (S1): normal (2+)    Active Range of Motion     Lumbar   Flexion:  Restriction level: minimal  Extension:  Restriction level: minimal  Left rotation:  Restriction level: minimal  Right rotation:  Restriction level: minimal    Joint Play   Joints within functional limits: L1, L2, L3, L4 and L5     Strength/Myotome Testing     Lumbar   Left   Normal strength    Right   Normal strength    Tests     Lumbar     Left   Negative slump test.     Left Hip   Negative REX and FADIR. Right Hip   Negative REX and FADIR. Ambulation   Weight-Bearing Status   Weight-Bearing Status (Left): full weight bearing     Ambulation: Stairs   Ascend stairs: independent  Pattern: reciprocal  Railings: one rail  Descend stairs: independent  Pattern: reciprocal  Railings: one rail    Observational Gait   Gait: within functional limits              Precautions: Anxiety, Non-Hodgkin's Lymphoma. Avoid upper back strengthening and use of bilateral UE x 2 weeks until physician follow up to assess healing of upper back incision on 6/13    POC expires Auth Status Unit limit Start date  Expiration date PT/OT + Visit Limit?  [employer]ay/ Co- Insurance   7/27/23 NA BOMN 6/1/23 NA NA               Visit/Unit Tracking  AUTH Status:  Date 6/1 6/6 6/8 6/13 6/15 6/20 6/22 6/27 6/29 7/5 7/11    NA Used 1 2 3 4 5 6 7 8 9 10 11     Remaining Manuals 7/11 6/6 6/8 6/13 6/15 6/20 6/22 6/27 6/29 7/5   Left sciatic nerve glides and tensioners  self MS    JF SC     Left piriformis pin and stretch  BROOKE            Left LAD  BROOKE MS          Overpressure  With prone press ups     SC BROOKE        piriformis STM roller     SC BROOKE  SC JF SC JF NV   RE performed JF            Neuro Re-Ed             Pt education  Centralization vs peripheralization   Use of towel roll when seated  HEP and DOMS          PPT  5" 2x10 5"x20  5"x20 5"x20 5"x20 5" 40x      PPT with march   x10 ea  x15 ea x20 x20 30x  30x     Supine t-band clamshells         GTB 3"x20 BTB NV   SL clamshells esperanza     2x10 3" ea     2x 10    SO  3 min  x3 min  1 min  2 min  3 min   reviewed      Prone press ups     2x 10  3x5   1x5 c/ OP 3x 10   reviewed      Prone glute set  5" 2x10 5" x20   5" 2x 10        Prone hip ext     10x B     x20 ea 20x    Prone hip ext and opp UE raises           10x ea. T-ball press in supine HL         5"x20                              Ther Ex             Nustep for cardio/ endurance.    6'  L3 rec bike x8' for ROM nustep L6 10 min  nustep 10 min L6  nustep 10 min L6  Nustep 10 min L6  Nustep 10 min L6  nustep 10 min    Seated left sciatic nerve glides Instructed Supine glides 15x   Seated tensioners 15x            Left piriformis stretch   30"x3  20"x4  30"x 3  30"x 3  30"x 3  30"x 3  30" x3    JENNIFER 2x10 at table 2x10 at table  2x10 vs table 2x 10  2x10                                                             Ther Activity                                       Gait Training                                       Modalities

## 2023-07-13 ENCOUNTER — APPOINTMENT (OUTPATIENT)
Dept: PHYSICAL THERAPY | Facility: CLINIC | Age: 78
End: 2023-07-13
Payer: COMMERCIAL

## 2023-09-11 ENCOUNTER — OFFICE VISIT (OUTPATIENT)
Dept: URGENT CARE | Facility: CLINIC | Age: 78
End: 2023-09-11
Payer: COMMERCIAL

## 2023-09-11 VITALS
BODY MASS INDEX: 23.16 KG/M2 | HEIGHT: 65 IN | OXYGEN SATURATION: 96 % | TEMPERATURE: 98 F | DIASTOLIC BLOOD PRESSURE: 65 MMHG | WEIGHT: 139 LBS | HEART RATE: 81 BPM | SYSTOLIC BLOOD PRESSURE: 139 MMHG

## 2023-09-11 DIAGNOSIS — J06.9 VIRAL UPPER RESPIRATORY ILLNESS: ICD-10-CM

## 2023-09-11 DIAGNOSIS — R05.1 ACUTE COUGH: Primary | ICD-10-CM

## 2023-09-11 DIAGNOSIS — J06.9 VIRAL URI WITH COUGH: ICD-10-CM

## 2023-09-11 LAB
SARS-COV-2 AG UPPER RESP QL IA: NEGATIVE
VALID CONTROL: NORMAL

## 2023-09-11 PROCEDURE — 87811 SARS-COV-2 COVID19 W/OPTIC: CPT

## 2023-09-11 PROCEDURE — 99213 OFFICE O/P EST LOW 20 MIN: CPT

## 2023-09-11 PROCEDURE — S9083 URGENT CARE CENTER GLOBAL: HCPCS

## 2023-09-11 RX ORDER — ALBUTEROL SULFATE 90 UG/1
2 AEROSOL, METERED RESPIRATORY (INHALATION) EVERY 6 HOURS PRN
Qty: 8.5 G | Refills: 0 | Status: SHIPPED | OUTPATIENT
Start: 2023-09-11

## 2023-09-11 RX ORDER — GUAIFENESIN AND CODEINE PHOSPHATE 100; 10 MG/5ML; MG/5ML
5 SOLUTION ORAL
Qty: 50 ML | Refills: 0 | Status: SHIPPED | OUTPATIENT
Start: 2023-09-11

## 2023-09-11 RX ORDER — PREDNISONE 20 MG/1
20 TABLET ORAL 2 TIMES DAILY WITH MEALS
Qty: 10 TABLET | Refills: 0 | Status: SHIPPED | OUTPATIENT
Start: 2023-09-11 | End: 2023-09-16

## 2023-09-11 NOTE — PATIENT INSTRUCTIONS
Prednisone as directed. Albuterol as needed for cough/wheezing/chest tightness. Guaifenesin-codeine as needed at bedtime. Follow up with PCP in 3-5 days. Proceed to the ER with worsening symptoms.

## 2023-09-11 NOTE — PROGRESS NOTES
[facility]        NAME: Tanya Drake is a 69 y.o. female  : 1945    MRN: [id number]  DATE: 2023  TIME: 12:21 PM    Assessment and Plan   Acute cough [R05.1]  1. Acute cough  Poct Covid 19 Rapid Antigen Test    albuterol (ProAir HFA) 90 mcg/act inhaler    guaifenesin-codeine (GUAIFENESIN AC) 100-10 MG/5ML liquid    predniSONE 20 mg tablet      2. Viral URI with cough        3. Viral upper respiratory illness  albuterol (ProAir HFA) 90 mcg/act inhaler    guaifenesin-codeine (GUAIFENESIN AC) 100-10 MG/5ML liquid    predniSONE 20 mg tablet        Rapid COVID negative. Patient Instructions     Prednisone as directed. Albuterol as needed for cough/wheezing/chest tightness. Guaifenesin-codeine as needed at bedtime. Follow up with PCP in 3-5 days. Proceed to the ER with worsening symptoms. Chief Complaint     Chief Complaint   Patient presents with   • Cough     Bilateral earache and sore throat. Patient has been prone to getting upper respiratory infections and bronchitis in the past. Symptoms have been similar for the past 48 hours. From past experiences patient tries to get something to fight off the cough and congestion before developing into Bronchitis or upper respiratory infection. History of Present Illness       The patient presents today with complaints of a wet cough and chest congestion that started on Sat. She denies fever/chills, SOB, wheezing, or recent known sick contacts. She took left over guaifenesin-codeine last night which helped. She had similar symptoms previously and was prescribed prednisone which helped. She is requesting this today. Denies history of asthma/COPD. Review of Systems   Review of Systems   Constitutional: Negative for chills and fever. HENT: Negative for congestion, ear pain, postnasal drip, rhinorrhea and sore throat. Respiratory: Positive for cough.     Gastrointestinal: Negative for abdominal pain, diarrhea, nausea and vomiting. Genitourinary: Negative for difficulty urinating. Musculoskeletal: Negative for myalgias. Skin: Negative for rash.          Current Medications       Current Outpatient Medications:   •  albuterol (ProAir HFA) 90 mcg/act inhaler, Inhale 2 puffs every 6 (six) hours as needed for wheezing, Disp: 8.5 g, Rfl: 0  •  alendronate (FOSAMAX) 70 mg tablet, Take 70 mg by mouth every 7 days, Disp: , Rfl:   •  Calcium 600-200 MG-UNIT per tablet, Take 1 tablet by mouth 2 (two) times a day, Disp: , Rfl:   •  Calcium Carb-Cholecalciferol (OSCAL-D) 500 mg-200 units per tablet, Take 1 tablet by mouth 2 (two) times a day with meals 600 calcium, Disp: , Rfl:   •  guaifenesin-codeine (GUAIFENESIN AC) 100-10 MG/5ML liquid, Take 5 mL by mouth daily at bedtime as needed for cough, Disp: 50 mL, Rfl: 0  •  latanoprost (XALATAN) 0.005 % ophthalmic solution, INSTILL 1 DROP INTO BOTH EYES IN THE EVENING, Disp: , Rfl:   •  predniSONE 20 mg tablet, Take 1 tablet (20 mg total) by mouth 2 (two) times a day with meals for 5 days, Disp: 10 tablet, Rfl: 0  •  naproxen (NAPROSYN) 500 mg tablet, Take 1 tablet (500 mg total) by mouth 2 (two) times a day as needed for moderate pain (Patient not taking: Reported on 9/11/2023), Disp: 60 tablet, Rfl: 0  •  zolpidem (AMBIEN) 5 mg tablet, Take 1 tablet by mouth daily (Patient not taking: Reported on 8/26/2021), Disp: , Rfl:     Current Allergies     Allergies as of 09/11/2023   • (No Known Allergies)            The following portions of the patient's history were reviewed and updated as appropriate: allergies, current medications, past family history, past medical history, past social history, past surgical history and problem list.     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 Objective   /65   Pulse 81   Temp 98 °F (36.7 °C)   Ht 5' 5" (1.651 m)   Wt 63 kg (139 lb)   SpO2 96%   BMI 23.13 kg/m²        Physical Exam     Physical Exam  Vitals and nursing note reviewed. Constitutional:       General: She is not in acute distress. Appearance: Normal appearance. She is not ill-appearing. HENT:      Head: Normocephalic and atraumatic. Right Ear: Tympanic membrane, ear canal and external ear normal.      Left Ear: Tympanic membrane, ear canal and external ear normal.      Nose: Nose normal. No congestion. Mouth/Throat:      Lips: Pink. Mouth: Mucous membranes are moist.      Pharynx: No oropharyngeal exudate or posterior oropharyngeal erythema. Tonsils: No tonsillar exudate. Eyes:      General: Vision grossly intact. Extraocular Movements: Extraocular movements intact. Pupils: Pupils are equal, round, and reactive to light. Cardiovascular:      Rate and Rhythm: Normal rate and regular rhythm. Heart sounds: Normal heart sounds. No murmur heard. Pulmonary:      Effort: Pulmonary effort is normal. No respiratory distress. Breath sounds: Normal breath sounds. No decreased air movement. No decreased breath sounds, wheezing, rhonchi or rales. Musculoskeletal:         General: Normal range of motion. Cervical back: Normal range of motion. Skin:     General: Skin is warm. Findings: No rash. Neurological:      Mental Status: She is alert and oriented to person, place, and time. Motor: Motor function is intact. Gait: Gait is intact.    Psychiatric:         Attention and Perception: Attention normal.         Mood and Affect: Mood normal.

## 2023-09-15 ENCOUNTER — APPOINTMENT (EMERGENCY)
Dept: RADIOLOGY | Facility: HOSPITAL | Age: 78
End: 2023-09-15
Payer: COMMERCIAL

## 2023-09-15 ENCOUNTER — HOSPITAL ENCOUNTER (EMERGENCY)
Facility: HOSPITAL | Age: 78
Discharge: HOME | End: 2023-09-15
Attending: EMERGENCY MEDICINE
Payer: COMMERCIAL

## 2023-09-15 ENCOUNTER — APPOINTMENT (EMERGENCY)
Dept: CT IMAGING | Facility: HOSPITAL | Age: 78
End: 2023-09-15
Payer: COMMERCIAL

## 2023-09-15 VITALS
OXYGEN SATURATION: 96 % | RESPIRATION RATE: 20 BRPM | SYSTOLIC BLOOD PRESSURE: 186 MMHG | DIASTOLIC BLOOD PRESSURE: 98 MMHG | TEMPERATURE: 97.9 F | HEART RATE: 68 BPM

## 2023-09-15 DIAGNOSIS — J47.9 BRONCHIECTASIS WITHOUT COMPLICATION (HCC): Primary | ICD-10-CM

## 2023-09-15 LAB
ALBUMIN SERPL BCP-MCNC: 4.1 G/DL (ref 3.5–5)
ALP SERPL-CCNC: 62 U/L (ref 34–104)
ALT SERPL W P-5'-P-CCNC: 12 U/L (ref 7–52)
ANION GAP SERPL CALCULATED.3IONS-SCNC: 7 MMOL/L
AST SERPL W P-5'-P-CCNC: 13 U/L (ref 13–39)
BASOPHILS # BLD AUTO: 0.03 THOUSANDS/ÂΜL (ref 0–0.1)
BASOPHILS NFR BLD AUTO: 0 % (ref 0–1)
BILIRUB SERPL-MCNC: 0.69 MG/DL (ref 0.2–1)
BUN SERPL-MCNC: 16 MG/DL (ref 5–25)
CALCIUM SERPL-MCNC: 9.2 MG/DL (ref 8.4–10.2)
CHLORIDE SERPL-SCNC: 106 MMOL/L (ref 96–108)
CO2 SERPL-SCNC: 21 MMOL/L (ref 21–32)
CREAT SERPL-MCNC: 0.73 MG/DL (ref 0.6–1.3)
EOSINOPHIL # BLD AUTO: 0.01 THOUSAND/ÂΜL (ref 0–0.61)
EOSINOPHIL NFR BLD AUTO: 0 % (ref 0–6)
ERYTHROCYTE [DISTWIDTH] IN BLOOD BY AUTOMATED COUNT: 13.7 % (ref 11.6–15.1)
FLUAV RNA RESP QL NAA+PROBE: NEGATIVE
FLUBV RNA RESP QL NAA+PROBE: NEGATIVE
GFR SERPL CREATININE-BSD FRML MDRD: 80 ML/MIN/1.73SQ M
GLUCOSE SERPL-MCNC: 180 MG/DL (ref 65–140)
HCT VFR BLD AUTO: 37.2 % (ref 34.8–46.1)
HGB BLD-MCNC: 12.7 G/DL (ref 11.5–15.4)
IMM GRANULOCYTES # BLD AUTO: 0.05 THOUSAND/UL (ref 0–0.2)
IMM GRANULOCYTES NFR BLD AUTO: 1 % (ref 0–2)
LYMPHOCYTES # BLD AUTO: 0.98 THOUSANDS/ÂΜL (ref 0.6–4.47)
LYMPHOCYTES NFR BLD AUTO: 14 % (ref 14–44)
MCH RBC QN AUTO: 30.2 PG (ref 26.8–34.3)
MCHC RBC AUTO-ENTMCNC: 34.1 G/DL (ref 31.4–37.4)
MCV RBC AUTO: 89 FL (ref 82–98)
MONOCYTES # BLD AUTO: 0.49 THOUSAND/ÂΜL (ref 0.17–1.22)
MONOCYTES NFR BLD AUTO: 7 % (ref 4–12)
NEUTROPHILS # BLD AUTO: 5.54 THOUSANDS/ÂΜL (ref 1.85–7.62)
NEUTS SEG NFR BLD AUTO: 78 % (ref 43–75)
NRBC BLD AUTO-RTO: 0 /100 WBCS
PLATELET # BLD AUTO: 284 THOUSANDS/UL (ref 149–390)
PMV BLD AUTO: 8.9 FL (ref 8.9–12.7)
POTASSIUM SERPL-SCNC: 4.1 MMOL/L (ref 3.5–5.3)
PROT SERPL-MCNC: 6.8 G/DL (ref 6.4–8.4)
RBC # BLD AUTO: 4.2 MILLION/UL (ref 3.81–5.12)
RSV RNA RESP QL NAA+PROBE: NEGATIVE
SARS-COV-2 RNA RESP QL NAA+PROBE: NEGATIVE
SODIUM SERPL-SCNC: 134 MMOL/L (ref 135–147)
WBC # BLD AUTO: 7.1 THOUSAND/UL (ref 4.31–10.16)

## 2023-09-15 PROCEDURE — 80053 COMPREHEN METABOLIC PANEL: CPT | Performed by: PHYSICIAN ASSISTANT

## 2023-09-15 PROCEDURE — 99284 EMERGENCY DEPT VISIT MOD MDM: CPT

## 2023-09-15 PROCEDURE — G1004 CDSM NDSC: HCPCS

## 2023-09-15 PROCEDURE — 0241U HB NFCT DS VIR RESP RNA 4 TRGT: CPT | Performed by: EMERGENCY MEDICINE

## 2023-09-15 PROCEDURE — 85025 COMPLETE CBC W/AUTO DIFF WBC: CPT | Performed by: PHYSICIAN ASSISTANT

## 2023-09-15 PROCEDURE — 99284 EMERGENCY DEPT VISIT MOD MDM: CPT | Performed by: PHYSICIAN ASSISTANT

## 2023-09-15 PROCEDURE — 71250 CT THORAX DX C-: CPT

## 2023-09-15 PROCEDURE — 36415 COLL VENOUS BLD VENIPUNCTURE: CPT | Performed by: PHYSICIAN ASSISTANT

## 2023-09-15 RX ORDER — AZITHROMYCIN 250 MG/1
TABLET, FILM COATED ORAL
Qty: 6 TABLET | Refills: 0 | Status: SHIPPED | OUTPATIENT
Start: 2023-09-15 | End: 2023-09-19

## 2023-09-15 RX ORDER — METHYLPREDNISOLONE 4 MG/1
TABLET ORAL
Qty: 21 TABLET | Refills: 0 | Status: SHIPPED | OUTPATIENT
Start: 2023-09-15

## 2023-09-16 NOTE — ED PROVIDER NOTES
History  Chief Complaint   Patient presents with   • Cough     Pt reports cough for 1 week, pt seen at urgent care on Monday, was given cough medicine, albuterol, and prednisone that finished today and pt is still experiencing the cough. These coughing spells cause the pt to not be able to catch her breath. Denies SOB otherwise, denies chest pain      Patient is a 69years old female with a past medical history of non-Hodgkin's lymphoma status post chemoradiation 2016, and anxiety who presented to the ED for evaluation of a persistent intermittent 1 week history of a dry cough. Patient describes the cough as intermittent with coughing spells. Admits unable to catch her breath during the coughing spells but otherwise denies any chest pain, shortness of breath, fever, chills or any other complaint on review of systems. Patient stated that she was seen at the urgent care 5 days ago and was prescribed guaifenesin, albuterol inhaler and prednisone which she completed with no improvement of her symptoms. Prior to Admission Medications   Prescriptions Last Dose Informant Patient Reported? Taking?    Calcium 600-200 MG-UNIT per tablet  Self Yes No   Sig: Take 1 tablet by mouth 2 (two) times a day   Calcium Carb-Cholecalciferol (OSCAL-D) 500 mg-200 units per tablet   Yes No   Sig: Take 1 tablet by mouth 2 (two) times a day with meals 600 calcium   albuterol (ProAir HFA) 90 mcg/act inhaler   No No   Sig: Inhale 2 puffs every 6 (six) hours as needed for wheezing   alendronate (FOSAMAX) 70 mg tablet  Self Yes No   Sig: Take 70 mg by mouth every 7 days   guaifenesin-codeine (GUAIFENESIN AC) 100-10 MG/5ML liquid   No No   Sig: Take 5 mL by mouth daily at bedtime as needed for cough   latanoprost (XALATAN) 0.005 % ophthalmic solution   Yes No   Sig: INSTILL 1 DROP INTO BOTH EYES IN THE EVENING   naproxen (NAPROSYN) 500 mg tablet   No No   Sig: Take 1 tablet (500 mg total) by mouth 2 (two) times a day as needed for moderate pain   Patient not taking: Reported on 9/11/2023   predniSONE 20 mg tablet   No No   Sig: Take 1 tablet (20 mg total) by mouth 2 (two) times a day with meals for 5 days   zolpidem (AMBIEN) 5 mg tablet  Self Yes No   Sig: Take 1 tablet by mouth daily   Patient not taking: Reported on 8/26/2021      Facility-Administered Medications: None       Past Medical History:   Diagnosis Date   • Anxiety    • History of bronchitis    • Non Hodgkin's lymphoma (HCC)     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 E-Cigarette/Vaping   • E-Cigarette Use Never User      E-Cigarette/Vaping Substances     Social History     Tobacco Use   • Smoking status: Never   • Smokeless tobacco: Never   Vaping Use   • Vaping Use: Never used   Substance Use Topics   • Alcohol use: Yes     Comment: socially   • Drug use: No       Review of Systems   Constitutional: Negative for chills and fever. HENT: Negative for congestion, ear pain and sore throat. Eyes: Negative for pain and visual disturbance. Respiratory: Positive for cough. Negative for shortness of breath, wheezing and stridor. Cardiovascular: Negative for chest pain and palpitations. Gastrointestinal: Negative for abdominal pain, diarrhea, nausea and vomiting. Genitourinary: Negative for dysuria and hematuria. Musculoskeletal: Negative for arthralgias and back pain. Skin: Negative for color change and rash. Neurological: Negative for seizures and syncope.    All other systems reviewed and are negative. Physical Exam  Physical Exam  Vitals and nursing note reviewed. Constitutional:       General: She is not in acute distress. Appearance: She is well-developed. She is not toxic-appearing or diaphoretic. HENT:      Head: Normocephalic and atraumatic. Nose: No congestion or rhinorrhea. Eyes:      Conjunctiva/sclera: Conjunctivae normal.   Cardiovascular:      Rate and Rhythm: Normal rate and regular rhythm. Heart sounds: No murmur heard. Pulmonary:      Effort: Pulmonary effort is normal. No respiratory distress. Breath sounds: Normal breath sounds. No stridor. No wheezing or rhonchi. Abdominal:      Palpations: Abdomen is soft. Tenderness: There is no abdominal tenderness. Musculoskeletal:         General: No swelling. Cervical back: Neck supple. Right lower leg: No edema. Left lower leg: No edema. Lymphadenopathy:      Cervical: No cervical adenopathy. Skin:     General: Skin is warm and dry. Capillary Refill: Capillary refill takes less than 2 seconds. Neurological:      Mental Status: She is alert and oriented to person, place, and time.    Psychiatric:         Mood and Affect: Mood normal.         Vital Signs  ED Triage Vitals [09/15/23 1935]   Temperature Pulse Respirations Blood Pressure SpO2   97.9 °F (36.6 °C) 99 20 (!) 186/98 98 %      Temp Source Heart Rate Source Patient Position - Orthostatic VS BP Location FiO2 (%)   Tympanic Monitor Sitting Left arm --      Pain Score       --           Vitals:    09/15/23 2100 09/15/23 2145 09/15/23 2200 09/15/23 2300   BP:       Pulse: 73 67 69 68   Patient Position - Orthostatic VS:             Visual Acuity      ED Medications  Medications - No data to display    Diagnostic Studies  Results Reviewed     Procedure Component Value Units Date/Time    Comprehensive metabolic panel [[id number]]  (Abnormal) Collected: 09/15/23 2139    Lab Status: Final result Specimen: Blood from Arm, Left Updated: 09/15/23 2212     Sodium 134 mmol/L      Potassium 4.1 mmol/L      Chloride 106 mmol/L      CO2 21 mmol/L      ANION GAP 7 mmol/L      BUN 16 mg/dL      Creatinine 0.73 mg/dL      Glucose 180 mg/dL      Calcium 9.2 mg/dL      AST 13 U/L      ALT 12 U/L      Alkaline Phosphatase 62 U/L      Total Protein 6.8 g/dL      Albumin 4.1 g/dL      Total Bilirubin 0.69 mg/dL      eGFR 80 ml/min/1.73sq m     Narrative:      National Kidney Disease Foundation guidelines for Chronic Kidney Disease (CKD):   •  Stage 1 with normal or high GFR (GFR > 90 mL/min/1.73 square meters)  •  Stage 2 Mild CKD (GFR = 60-89 mL/min/1.73 square meters)  •  Stage 3A Moderate CKD (GFR = 45-59 mL/min/1.73 square meters)  •  Stage 3B Moderate CKD (GFR = 30-44 mL/min/1.73 square meters)  •  Stage 4 Severe CKD (GFR = 15-29 mL/min/1.73 square meters)  •  Stage 5 End Stage CKD (GFR <15 mL/min/1.73 square meters)  Note: GFR calculation is accurate only with a steady state creatinine    CBC and differential [[id number]]  (Abnormal) Collected: 09/15/23 2117    Lab Status: Final result Specimen: Blood from Arm, Left Updated: 09/15/23 2123     WBC 7.10 Thousand/uL      RBC 4.20 Million/uL      Hemoglobin 12.7 g/dL      Hematocrit 37.2 %      MCV 89 fL      MCH 30.2 pg      MCHC 34.1 g/dL      RDW 13.7 %      MPV 8.9 fL      Platelets 043 Thousands/uL      nRBC 0 /100 WBCs      Neutrophils Relative 78 %      Immat GRANS % 1 %      Lymphocytes Relative 14 %      Monocytes Relative 7 %      Eosinophils Relative 0 %      Basophils Relative 0 %      Neutrophils Absolute 5.54 Thousands/µL      Immature Grans Absolute 0.05 Thousand/uL      Lymphocytes Absolute 0.98 Thousands/µL      Monocytes Absolute 0.49 Thousand/µL      Eosinophils Absolute 0.01 Thousand/µL      Basophils Absolute 0.03 Thousands/µL     FLU/RSV/COVID - if FLU/RSV clinically relevant [[id number]]  (Normal) Collected: 09/15/23 1957    Lab Status: Final result Specimen: Nares from Nose Updated: 09/15/23 2106     SARS-CoV-2 Negative     INFLUENZA A PCR Negative     INFLUENZA B PCR Negative     RSV PCR Negative    Narrative:      FOR PEDIATRIC PATIENTS - copy/paste COVID Guidelines URL to browser: [email or web address]    SARS-CoV-2 assay is a Nucleic Acid Amplification assay intended for the  qualitative detection of nucleic acid from SARS-CoV-2 in nasopharyngeal  swabs. Results are for the presumptive identification of SARS-CoV-2 RNA. Positive results are indicative of infection with SARS-CoV-2, the virus  causing COVID-19, but do not rule out bacterial infection or co-infection  with other viruses. Laboratories within the [facility] and its  territories are required to report all positive results to the appropriate  public health authorities. Negative results do not preclude SARS-CoV-2  infection and should not be used as the sole basis for treatment or other  patient management decisions. Negative results must be combined with  clinical observations, patient history, and epidemiological information. This test has not been FDA cleared or approved. This test has been authorized by FDA under an Emergency Use Authorization  (EUA). This test is only authorized for the duration of time the  declaration that circumstances exist justifying the authorization of the  emergency use of an in vitro diagnostic tests for detection of SARS-CoV-2  virus and/or diagnosis of COVID-19 infection under section 564(b)(1) of  the Act, 21 U. S.C. 411QGF-1(D)(9), unless the authorization is terminated  or revoked sooner. The test has been validated but independent review by FDA  and CLIA is pending. Test performed using [employer] GeneXpert: This RT-PCR assay targets N2,  a region unique to SARS-CoV-2. A conserved region in the E-gene was chosen  for pan-Sarbecovirus detection which includes SARS-CoV-2.     According to CMS-2020-01-R, this platform meets the definition of high-throughput technology. CT chest without contrast   Final Result by Violet Lantigua MD (09/15 2120)      There is mild cylindrical bronchiectasis noted. No evidence of acute airspace consolidation. Scattered areas of mild subsegmental atelectasis and/or scarring         Workstation performed: [id number]                    Procedures  Procedures         ED Course  ED Course as of 09/16/23 0118   Fri Sep 15, 2023   2123 CT chest without contrast  IMPRESSION:     There is mild cylindrical bronchiectasis noted. No evidence of acute airspace consolidation. Scattered areas of mild subsegmental atelectasis and/or scarring        Workstation performed: [id number]   2256 Patient reevaluated and informed of her CAT scan findings and the need to follow-up with a pulmonologist.               Identification of Seniors at [facility] Most Recent Value   (ISAR) Identification of Seniors at Risk    Before the illness or injury that brought you to the Emergency, did you need someone to help you on a regular basis? 0 Filed at: 09/15/2023 1937   In the last 24 hours, have you needed more help than usual? 0 Filed at: 09/15/2023 7205   Have you been hospitalized for one or more nights during the past 6 months? 0 Filed at: 09/15/2023 1937   In general, do you see well? 0 Filed at: 09/15/2023 1937   In general, do you have serious problems with your memory? 0 Filed at: 09/15/2023 1937   Do you take more than three different medications every day? 0 Filed at: 09/15/2023 1937   ISAR Score 0 Filed at: 09/15/2023 1937                      SBIRT 22yo+    Flowsheet Row Most Recent Value   Initial Alcohol Screen: US AUDIT-C     1. How often do you have a drink containing alcohol? 0 Filed at: 09/15/2023 1937   2. How many drinks containing alcohol do you have on a typical day you are drinking? 0 Filed at: 09/15/2023 1937   3a. Male UNDER 65: How often do you have five or more drinks on one occasion?  0 Filed at: 09/15/2023 1937   3b. FEMALE Any Age, or MALE 65+: How often do you have 4 or more drinks on one occassion? 0 Filed at: 09/15/2023 1937   Audit-C Score 0 Filed at: 09/15/2023 1937   NANCY: How many times in the past year have you. .. Used an illegal drug or used a prescription medication for non-medical reasons? Never Filed at: 09/15/2023 1937                    Medical Decision Making  History and exam finding concerning for an acute bronchitis versus pneumonia versus malignancy. Basic blood work and a dry CAT scan was ordered. Dry skin was preferred instead of a chest x-ray due to patient's prior history of malignancy. Diagnostic blood work came back notable for no significant laboratory findings. CT scan of the chest showed bronchiectasis. Patient reevaluated and instructed on her test results and was discharged home with a prescription for Medrol Dosepak and Z-Sebastian. Outpatient ambulatory referral was made for patient to follow-up with the pulmonologist.  Annie Brasher to return for any new or worsening of symptoms. Patient stable upon discharge. Amount and/or Complexity of Data Reviewed  Labs: ordered. Decision-making details documented in ED Course. Radiology: ordered. Decision-making details documented in ED Course. Risk  Prescription drug management. Disposition  Final diagnoses:   Bronchiectasis without complication ([address])     Time reflects when diagnosis was documented in both MDM as applicable and the Disposition within this note     Time User Action Codes Description Comment    9/15/2023 10:38 PM AtToshia rondon Add [J47.9] Bronchiectasis without complication [facility])       ED Disposition     ED Disposition   Discharge    Condition   Stable    Date/Time   Fri Sep 15, 2023 10:38 PM    Comment   Luanne Lorenz discharge to home/self care.                Follow-up Information     Follow up With Specialties Details Why Mariangel Huerta MD Internal Medicine Call in 1 day [address] [address]  [phone]            Discharge Medication List as of 9/15/2023 10:42 PM      START taking these medications    Details   azithromycin (ZITHROMAX) 250 mg tablet Take 2 tablets today then 1 tablet daily x 4 days, Normal      methylPREDNISolone 4 MG tablet therapy pack Use as directed on package, Normal         CONTINUE these medications which have NOT CHANGED    Details   albuterol (ProAir HFA) 90 mcg/act inhaler Inhale 2 puffs every 6 (six) hours as needed for wheezing, Starting Mon 9/11/2023, Normal      alendronate (FOSAMAX) 70 mg tablet Take 70 mg by mouth every 7 days, Historical Med      Calcium 600-200 MG-UNIT per tablet Take 1 tablet by mouth 2 (two) times a day, Historical Med      Calcium Carb-Cholecalciferol (OSCAL-D) 500 mg-200 units per tablet Take 1 tablet by mouth 2 (two) times a day with meals 600 calcium, Historical Med      guaifenesin-codeine (GUAIFENESIN AC) 100-10 MG/5ML liquid Take 5 mL by mouth daily at bedtime as needed for cough, Starting Mon 9/11/2023, Normal      latanoprost (XALATAN) 0.005 % ophthalmic solution INSTILL 1 DROP INTO BOTH EYES IN THE EVENING, Historical Med      naproxen (NAPROSYN) 500 mg tablet Take 1 tablet (500 mg total) by mouth 2 (two) times a day as needed for moderate pain, Starting Wed 5/24/2023, Normal      predniSONE 20 mg tablet Take 1 tablet (20 mg total) by mouth 2 (two) times a day with meals for 5 days, Starting Mon 9/11/2023, Until Sat 9/16/2023, Normal      zolpidem (AMBIEN) 5 mg tablet Take 1 tablet by mouth daily, Starting Mon 7/30/2018, Historical Med                 PDMP Review       Value Time User    PDMP Reviewed  Yes 9/11/2023 12:19 PM Suzanne Lema, [address] [place] [address]          ED Provider  Electronically Signed by           Radha Renteria PA-C  09/16/23 6995

## 2023-09-16 NOTE — DISCHARGE INSTRUCTIONS
Continue supportive care as instructed. Call for follow-up with a pulmonologist as instructed. Take prescribed medications as instructed. Return for any new or worsening of symptoms.
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
## 2024-09-01 ENCOUNTER — HOSPITAL ENCOUNTER (EMERGENCY)
Facility: HOSPITAL | Age: 79
Discharge: HOME | End: 2024-09-01
Attending: EMERGENCY MEDICINE
Payer: COMMERCIAL

## 2024-09-01 ENCOUNTER — APPOINTMENT (EMERGENCY)
Dept: CT IMAGING | Facility: HOSPITAL | Age: 79
End: 2024-09-01
Payer: COMMERCIAL

## 2024-09-01 VITALS
RESPIRATION RATE: 18 BRPM | HEART RATE: 105 BPM | SYSTOLIC BLOOD PRESSURE: 147 MMHG | TEMPERATURE: 98.2 F | DIASTOLIC BLOOD PRESSURE: 73 MMHG | OXYGEN SATURATION: 96 %

## 2024-09-01 DIAGNOSIS — M54.9 ACUTE BACK PAIN: Primary | ICD-10-CM

## 2024-09-01 LAB
ALBUMIN SERPL BCG-MCNC: 4.2 G/DL (ref 3.5–5)
ALP SERPL-CCNC: 92 U/L (ref 34–104)
ALT SERPL W P-5'-P-CCNC: 16 U/L (ref 7–52)
ANION GAP SERPL CALCULATED.3IONS-SCNC: 9 MMOL/L (ref 4–13)
AST SERPL W P-5'-P-CCNC: 12 U/L (ref 13–39)
BASOPHILS # BLD MANUAL: 0.05 THOUSAND/UL (ref 0–0.1)
BASOPHILS NFR MAR MANUAL: 1 % (ref 0–1)
BILIRUB DIRECT SERPL-MCNC: 0.19 MG/DL (ref 0–0.2)
BILIRUB SERPL-MCNC: 2.18 MG/DL (ref 0.2–1)
BILIRUB UR QL STRIP: NEGATIVE
BUN SERPL-MCNC: 20 MG/DL (ref 5–25)
CALCIUM SERPL-MCNC: 10.3 MG/DL (ref 8.4–10.2)
CHLORIDE SERPL-SCNC: 102 MMOL/L (ref 96–108)
CLARITY UR: CLEAR
CO2 SERPL-SCNC: 22 MMOL/L (ref 21–32)
COLOR UR: NORMAL
CREAT SERPL-MCNC: 0.62 MG/DL (ref 0.6–1.3)
EOSINOPHIL # BLD MANUAL: 0 THOUSAND/UL (ref 0–0.4)
EOSINOPHIL NFR BLD MANUAL: 0 % (ref 0–6)
ERYTHROCYTE [DISTWIDTH] IN BLOOD BY AUTOMATED COUNT: 13.6 % (ref 11.6–15.1)
GFR SERPL CREATININE-BSD FRML MDRD: 87 ML/MIN/1.73SQ M
GLUCOSE SERPL-MCNC: 124 MG/DL (ref 65–140)
GLUCOSE UR STRIP-MCNC: NEGATIVE MG/DL
HCT VFR BLD AUTO: 35.9 % (ref 34.8–46.1)
HGB BLD-MCNC: 12 G/DL (ref 11.5–15.4)
HGB UR QL STRIP.AUTO: NEGATIVE
KETONES UR STRIP-MCNC: NEGATIVE MG/DL
LEUKOCYTE ESTERASE UR QL STRIP: NEGATIVE
LYMPHOCYTES # BLD AUTO: 1.08 THOUSAND/UL (ref 0.6–4.47)
LYMPHOCYTES # BLD AUTO: 21 % (ref 14–44)
MCH RBC QN AUTO: 29.6 PG (ref 26.8–34.3)
MCHC RBC AUTO-ENTMCNC: 33.4 G/DL (ref 31.4–37.4)
MCV RBC AUTO: 89 FL (ref 82–98)
METAMYELOCYTE ABSOLUTE CT: 0.14 THOUSAND/UL (ref 0–0.1)
METAMYELOCYTES NFR BLD MANUAL: 3 % (ref 0–1)
MONOCYTES # BLD AUTO: 0.77 THOUSAND/UL (ref 0–1.22)
MONOCYTES NFR BLD: 17 % (ref 4–12)
NEUTROPHILS # BLD MANUAL: 2.48 THOUSAND/UL (ref 1.85–7.62)
NEUTS BAND NFR BLD MANUAL: 9 % (ref 0–8)
NEUTS SEG NFR BLD AUTO: 46 % (ref 43–75)
NITRITE UR QL STRIP: NEGATIVE
PH UR STRIP.AUTO: 6 [PH]
PLATELET # BLD AUTO: 166 THOUSANDS/UL (ref 149–390)
PLATELET BLD QL SMEAR: ADEQUATE
PMV BLD AUTO: 10 FL (ref 8.9–12.7)
POTASSIUM SERPL-SCNC: 4.3 MMOL/L (ref 3.5–5.3)
PROT SERPL-MCNC: 6.8 G/DL (ref 6.4–8.4)
PROT UR STRIP-MCNC: NEGATIVE MG/DL
RBC # BLD AUTO: 4.05 MILLION/UL (ref 3.81–5.12)
SODIUM SERPL-SCNC: 133 MMOL/L (ref 135–147)
SP GR UR STRIP.AUTO: 1.01 (ref 1–1.03)
UROBILINOGEN UR STRIP-ACNC: <2 MG/DL
VARIANT LYMPHS # BLD AUTO: 3 %
WBC # BLD AUTO: 4.5 THOUSAND/UL (ref 4.31–10.16)

## 2024-09-01 PROCEDURE — 74177 CT ABD & PELVIS W/CONTRAST: CPT

## 2024-09-01 PROCEDURE — 36415 COLL VENOUS BLD VENIPUNCTURE: CPT | Performed by: EMERGENCY MEDICINE

## 2024-09-01 PROCEDURE — 85007 BL SMEAR W/DIFF WBC COUNT: CPT | Performed by: EMERGENCY MEDICINE

## 2024-09-01 PROCEDURE — 71275 CT ANGIOGRAPHY CHEST: CPT

## 2024-09-01 PROCEDURE — 85027 COMPLETE CBC AUTOMATED: CPT | Performed by: EMERGENCY MEDICINE

## 2024-09-01 PROCEDURE — 96375 TX/PRO/DX INJ NEW DRUG ADDON: CPT

## 2024-09-01 PROCEDURE — 80048 BASIC METABOLIC PNL TOTAL CA: CPT | Performed by: EMERGENCY MEDICINE

## 2024-09-01 PROCEDURE — 80076 HEPATIC FUNCTION PANEL: CPT | Performed by: EMERGENCY MEDICINE

## 2024-09-01 PROCEDURE — 99284 EMERGENCY DEPT VISIT MOD MDM: CPT

## 2024-09-01 PROCEDURE — 99285 EMERGENCY DEPT VISIT HI MDM: CPT | Performed by: EMERGENCY MEDICINE

## 2024-09-01 PROCEDURE — 81003 URINALYSIS AUTO W/O SCOPE: CPT | Performed by: EMERGENCY MEDICINE

## 2024-09-01 PROCEDURE — 96374 THER/PROPH/DIAG INJ IV PUSH: CPT

## 2024-09-01 RX ORDER — FAMOTIDINE 20 MG/1
20 TABLET, FILM COATED ORAL 2 TIMES DAILY
Qty: 60 TABLET | Refills: 0 | Status: SHIPPED | OUTPATIENT
Start: 2024-09-01

## 2024-09-01 RX ORDER — HYDROCODONE BITARTRATE AND ACETAMINOPHEN 5; 325 MG/1; MG/1
1 TABLET ORAL EVERY 6 HOURS PRN
Qty: 10 TABLET | Refills: 0 | Status: SHIPPED | OUTPATIENT
Start: 2024-09-01

## 2024-09-01 RX ORDER — LIDOCAINE 50 MG/G
1 PATCH TOPICAL ONCE
Status: DISCONTINUED | OUTPATIENT
Start: 2024-09-01 | End: 2024-09-01 | Stop reason: HOSPADM

## 2024-09-01 RX ORDER — KETOROLAC TROMETHAMINE 30 MG/ML
15 INJECTION, SOLUTION INTRAMUSCULAR; INTRAVENOUS ONCE
Status: COMPLETED | OUTPATIENT
Start: 2024-09-01 | End: 2024-09-01

## 2024-09-01 RX ORDER — FAMOTIDINE 20 MG/1
20 TABLET, FILM COATED ORAL ONCE
Status: COMPLETED | OUTPATIENT
Start: 2024-09-01 | End: 2024-09-01

## 2024-09-01 RX ADMIN — IOHEXOL 100 ML: 350 INJECTION, SOLUTION INTRAVENOUS at 04:22

## 2024-09-01 RX ADMIN — LIDOCAINE 1 PATCH: 50 PATCH CUTANEOUS at 02:48

## 2024-09-01 RX ADMIN — KETOROLAC TROMETHAMINE 15 MG: 30 INJECTION, SOLUTION INTRAMUSCULAR at 02:48

## 2024-09-01 RX ADMIN — FAMOTIDINE 20 MG: 20 TABLET, FILM COATED ORAL at 05:33

## 2024-09-01 RX ADMIN — MORPHINE SULFATE 2 MG: 2 INJECTION, SOLUTION INTRAMUSCULAR; INTRAVENOUS at 02:48

## 2024-09-01 NOTE — DISCHARGE INSTRUCTIONS
Take ibuprofen (Motrin, Advil) or acetaminophen (Tylenol) as needed for pain, as per the instructions.  If symptoms are related to stomach irritation, anti-inflammatory medications may worsen symptoms.  Take the prescription pain medication as needed for continued severe pain.  You can try ice, heat, topical medications to the area.  Drink plenty of fluids, and eat as you are able to tolerate.  Follow-up with your primary care doctor to make sure you are doing better.  Return if you develop fevers, severe worsening or changing of pain, persistent vomiting and difficulty tolerating fluids, or for any other concerns.

## 2024-09-01 NOTE — ED PROVIDER NOTES
History  Chief Complaint   Patient presents with    Back Pain     Pt states lower back pain starting around 1900 last night pain radiating towards middle back denies down legs. States a hx of back spasms but does not feel similar      HPI    Prior to Admission Medications   Prescriptions Last Dose Informant Patient Reported? Taking?   Calcium 600-200 MG-UNIT per tablet  Self Yes No   Sig: Take 1 tablet by mouth 2 (two) times a day   Calcium Carb-Cholecalciferol (OSCAL-D) 500 mg-200 units per tablet   Yes No   Sig: Take 1 tablet by mouth 2 (two) times a day with meals 600 calcium   albuterol (ProAir HFA) 90 mcg/act inhaler   No No   Sig: Inhale 2 puffs every 6 (six) hours as needed for wheezing   alendronate (FOSAMAX) 70 mg tablet  Self Yes No   Sig: Take 70 mg by mouth every 7 days   guaifenesin-codeine (GUAIFENESIN AC) 100-10 MG/5ML liquid   No No   Sig: Take 5 mL by mouth daily at bedtime as needed for cough   latanoprost (XALATAN) 0.005 % ophthalmic solution   Yes No   Sig: INSTILL 1 DROP INTO BOTH EYES IN THE EVENING   methylPREDNISolone 4 MG tablet therapy pack   No No   Sig: Use as directed on package   naproxen (NAPROSYN) 500 mg tablet   No No   Sig: Take 1 tablet (500 mg total) by mouth 2 (two) times a day as needed for moderate pain   Patient not taking: Reported on 9/11/2023   zolpidem (AMBIEN) 5 mg tablet  Self Yes No   Sig: Take 1 tablet by mouth daily   Patient not taking: Reported on 8/26/2021      Facility-Administered Medications: None       Past Medical History:   Diagnosis Date    Anxiety     History of bronchitis     Non Hodgkin's lymphoma (HCC)     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    E-Cigarette/Vaping    E-Cigarette Use Never User      E-Cigarette/Vaping Substances     Social History     Tobacco Use    Smoking status: Never    Smokeless tobacco: Never   Vaping Use    Vaping status: Never Used   Substance Use Topics    Alcohol use: Yes     Comment: socially    Drug use: No       Review of Systems    Physical Exam  Physical Exam  Vitals and nursing note reviewed.   Constitutional:       General: She is not in acute distress.     Appearance: She is well-developed.      Comments: Appears uncomfortable   HENT:      Head: Normocephalic and atraumatic.   Eyes:      Conjunctiva/sclera: Conjunctivae normal.      Pupils: Pupils are equal, round, and reactive to light.   Neck:      Trachea: No tracheal deviation.   Cardiovascular:      Rate and Rhythm: Regular rhythm. Tachycardia present.      Pulses:           Dorsalis pedis pulses are 2+ on the right side and 2+ on the left side.      Heart sounds: Normal heart sounds.   Pulmonary:      Effort: Pulmonary effort is normal. No respiratory distress.      Breath sounds: Normal breath sounds.   Abdominal:      General: There is no distension.      Palpations: Abdomen is soft.      Tenderness: There is no abdominal tenderness.   Musculoskeletal:      Cervical back: Normal range of motion.      Thoracic back: No tenderness or bony tenderness.      Lumbar back: No tenderness or bony tenderness.        Back:    Skin:     General: Skin is warm and dry.   Neurological:      Mental Status: She is alert and oriented to person, place, and time.      GCS: GCS eye subscore is 4. GCS verbal subscore is 5. GCS motor subscore is 6.      Comments: Normal strength and sensation in bilateral legs.  No saddle anesthesia.  Normal sensation in the webspace of the fourth and fifth toes.   Psychiatric:          Mood and Affect: Mood and affect normal.         Behavior: Behavior normal.         Vital Signs  ED Triage Vitals [09/01/24 0146]   Temperature Pulse Respirations Blood Pressure SpO2   98.2 °F (36.8 °C) (!) 115 22 159/74 98 %      Temp Source Heart Rate Source Patient Position - Orthostatic VS BP Location FiO2 (%)   Oral Monitor Sitting Right arm --      Pain Score       9           Vitals:    09/01/24 0146 09/01/24 0535   BP: 159/74 147/73   Pulse: (!) 115 105   Patient Position - Orthostatic VS: Sitting Sitting         Visual Acuity      ED Medications  Medications   lidocaine (LIDODERM) 5 % patch 1 patch (1 patch Topical Medication Applied 9/1/24 0248)   morphine injection 2 mg (2 mg Intravenous Given 9/1/24 0248)   ketorolac (TORADOL) injection 15 mg (15 mg Intravenous Given 9/1/24 0248)   iohexol (OMNIPAQUE) 350 MG/ML injection (MULTI-DOSE) 100 mL (100 mL Intravenous Given 9/1/24 0422)   famotidine (PEPCID) tablet 20 mg (20 mg Oral Given 9/1/24 0533)       Diagnostic Studies  Results Reviewed       Procedure Component Value Units Date/Time    Basic metabolic panel [[id number]]  (Abnormal) Collected: 09/01/24 0243    Lab Status: Final result Specimen: Blood from Hand, Right Updated: 09/01/24 0410     Sodium 133 mmol/L      Potassium 4.3 mmol/L      Chloride 102 mmol/L      CO2 22 mmol/L      ANION GAP 9 mmol/L      BUN 20 mg/dL      Creatinine 0.62 mg/dL      Glucose 124 mg/dL      Calcium 10.3 mg/dL      eGFR 87 ml/min/1.73sq m     Narrative:      [employer] guidelines for Chronic Kidney Disease (CKD):     Stage 1 with normal or high GFR (GFR > 90 mL/min/1.73 square meters)    Stage 2 Mild CKD (GFR = 60-89 mL/min/1.73 square meters)    Stage 3A Moderate CKD (GFR = 45-59 mL/min/1.73 square meters)    Stage 3B Moderate CKD (GFR = 30-44 mL/min/1.73 square meters)    Stage 4 Severe CKD (GFR = 15-29 mL/min/1.73 square meters)    Stage 5 End Stage CKD (GFR <15 mL/min/1.73 square  meters)  Note: GFR calculation is accurate only with a steady state creatinine    Manual Differential(PHLEBS Do Not Order) [[id number]]  (Abnormal) Collected: 09/01/24 0243    Lab Status: Final result Specimen: Blood from Hand, Right Updated: 09/01/24 0402     Segmented % 46 %      Bands % 9 %      Lymphocytes % 21 %      Monocytes % 17 %      Eosinophils % 0 %      Basophils % 1 %      Metamyelocytes % 3 %      Atypical Lymphocytes % 3 %      Absolute Neutrophils 2.48 Thousand/uL      Absolute Lymphocytes 1.08 Thousand/uL      Absolute Monocytes 0.77 Thousand/uL      Absolute Eosinophils 0.00 Thousand/uL      Absolute Basophils 0.05 Thousand/uL      Absolute Metamyelocytes 0.14 Thousand/uL      Total Counted --     Platelet Estimate Adequate    Hepatic function panel [[id number]]  (Abnormal) Collected: 09/01/24 0243    Lab Status: Final result Specimen: Blood from Hand, Right Updated: 09/01/24 0357     Total Bilirubin 2.18 mg/dL      Bilirubin, Direct 0.19 mg/dL      Alkaline Phosphatase 92 U/L      AST 12 U/L      ALT 16 U/L      Total Protein 6.8 g/dL      Albumin 4.2 g/dL     UA w Reflex to Microscopic w Reflex to Culture [[id number]] Collected: 09/01/24 0333    Lab Status: Final result Specimen: Urine, Clean Catch Updated: 09/01/24 0340     Color, UA Light Yellow     Clarity, UA Clear     Specific Gravity, UA 1.010     pH, UA 6.0     Leukocytes, UA Negative     Nitrite, UA Negative     Protein, UA Negative mg/dl      Glucose, UA Negative mg/dl      Ketones, UA Negative mg/dl      Urobilinogen, UA <2.0 mg/dl      Bilirubin, UA Negative     Occult Blood, UA Negative    CBC and differential [[id number]]  (Normal) Collected: 09/01/24 0243    Lab Status: Final result Specimen: Blood from Hand, Right Updated: 09/01/24 0325     WBC 4.50 Thousand/uL      RBC 4.05 Million/uL      Hemoglobin 12.0 g/dL      Hematocrit 35.9 %      MCV 89 fL      MCH 29.6 pg      MCHC 33.4 g/dL      RDW 13.6 %      MPV 10.0 fL      Platelets  166 Thousands/uL                    PE Study with CT Abdomen and Pelvis with contrast   Final Result by Neto Puckett MD (09/01 0511)      1.  No pulmonary embolism   2.  Subacute right rib fractures   3.  No acute abdominopelvic abnormality                     Workstation performed: [id number]                    Procedures  Procedures         ED Course                                 SBIRT 22yo+      Flowsheet Row Most Recent Value   Initial Alcohol Screen: US AUDIT-C     1. How often do you have a drink containing alcohol? 0 Filed at: 09/01/2024 0151   2. How many drinks containing alcohol do you have on a typical day you are drinking?  0 Filed at: 09/01/2024 0151   3b. FEMALE Any Age, or MALE 65+: How often do you have 4 or more drinks on one occassion? 0 Filed at: 09/01/2024 0151   Audit-C Score 0 Filed at: 09/01/2024 0151   NANCY: How many times in the past year have you...    Used an illegal drug or used a prescription medication for non-medical reasons? Never Filed at: 09/01/2024 0151            Wells' Criteria for PE      Flowsheet Row Most Recent Value   Wells' Criteria for PE    Clinical signs and symptoms of DVT 0 Filed at: 09/01/2024 0233   PE is primary diagnosis or equally likely 3 Filed at: 09/01/2024 0233   HR >100 1.5 Filed at: 09/01/2024 0233   Immobilization at least 3 days or Surgery in the previous 4 weeks 1.5 Filed at: 09/01/2024 0233   Previous, objectively diagnosed PE or DVT 0 Filed at: 09/01/2024 0233   Hemoptysis 0 Filed at: 09/01/2024 0233   Malignancy with treatment within 6 months or palliative 1 Filed at: 09/01/2024 0233   Wells' Criteria Total 7 Filed at: 09/01/2024 0233                  Medical Decision Making  This is a 78-year-old female who presents here today with back pain.  She states it is to the lower back bilaterally, pointing to the lower thoracic/upper lumbar back.  She says it stays to the area, sometimes worse with movements, but says she feels like she has to keep moving  around to help with pain.  She has had problems with back spasms before, but usually initiating from the upper back, in a different area and feeling different from what she is having currently.  She denies falls or trauma to her back.  She started chemotherapy for breast cancer on 8/26, and has been having generalized malaise and weakness since then.  She denies any nausea or vomiting.  She had a recent vasectomy a month ago due to her found breast mass.  She was told that given localized area with clear surgical margins, PET scan or other imaging to assess for metastases was not indicated.  However, due to testing performed, was advised that she would likely benefit from several rounds of chemotherapy.  She denies any abdominal pain.  She does endorse some urinary frequency since yesterday evening but no dysuria, hematuria, difficulties urinating.  She does state that sometimes taking a deep breath causes worsening pain but denies any chest pain, shortness of breath, palpitations.  She denies recent fevers or URI symptoms.  She has no nausea, vomiting, diarrhea.  She says a heating pad usually helps her muscle spasms but it was not helpful last night, nor was Tylenol, ice, or taking one of her husbands oxycodone that he had leftover from prior knee surgery, which provided no relief of pain.    Review of systems: Otherwise negative listed as above    She is uncomfortable appearing, and frequently moving around, but is in no acute distress.  She has no tenderness to her back in the area of reported pain, and no abdominal tenderness.  She is tachycardic.  She has normal strength and sensation in bilateral legs.  Exam is otherwise unremarkable.  Differential includes possible PE with tachycardia and some pleuritic component, and she is high risk by Wells score, possible metastases causing pain, and she does have a remote history of non-Hodgkin's lymphoma so could be recurrence of this, possible pyelonephritis or kidney  stone given some urinary symptoms, possible side effects of chemotherapy to include gastritis, colitis, neutropenia, hepatitis, unrelated cholelithiasis or cholecystitis, diverticulitis, referral from other intra-abdominal or retroperitoneal pathology, including possible AAA.  Will check lab work and CT scan to evaluate, treat pain.    Lab work shows bilirubin of 2.18, increased slightly from recent values of 1.5.  Urine is clear without signs of infection.  She has no neutropenia, but does have immature forms though did recently get Neulasta.  Without fevers or focal source of infection, I think this is more likely related to Neulasta.  CT scan shows no PE, subacute right rib fractures, and no other acute abnormalities to explain her pain.  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  CT does show some distal esophageal thickening, and she does endorse some belching sensation while pain was bad.  This could be gastritis that is contributing to her symptoms.  She endorses resolution of them at this time.  I discussed with her findings, uncertain exact etiology, but no concerning abnormalities on workup thus far, continued management at home, follow-up with her doctors, and indications for return, she expresses understanding with this plan.    Problems Addressed:  Acute back pain: acute illness or injury    Amount and/or Complexity of Data Reviewed  Independent Historian: spouse  External Data Reviewed: labs and notes.  Labs: ordered. Decision-making details documented in ED Course.  Radiology: ordered and independent interpretation performed. Decision-making details documented in ED Course.    Risk  Prescription drug management.                 Disposition  Final diagnoses:   Acute back pain     Time reflects when diagnosis was documented in both MDM as applicable and the Disposition within this note       Time  User Action Codes Description Comment    9/1/2024  5:49 AM Brittani Pitts Add [M54.9] Acute back pain           ED Disposition       ED Disposition   Discharge    Condition   Fair    Date/Time   Sun Sep 1, 2024 0549    Comment   Luanne Lorenz discharge to home/self care.             Follow-up Information       Follow up With Specialties Details Why Contact Info    Shonna Nguyen MD Internal Medicine Schedule an appointment as soon as possible for a visit in 3 days to follow up on your symptoms [address]  [address]  [facility] [address] [phone]              Patient's Medications   Discharge Prescriptions    FAMOTIDINE (PEPCID) 20 MG TABLET    Take 1 tablet (20 mg total) by mouth 2 (two) times a day       Start Date: 9/1/2024  End Date: --       Order Dose: 20 mg       Quantity: 60 tablet    Refills: 0    HYDROCODONE-ACETAMINOPHEN (NORCO) 5-325 MG PER TABLET    Take 1 tablet by mouth every 6 (six) hours as needed for pain (severe pain, not otherwise controlled) Max Daily Amount: 4 tablets       Start Date: 9/1/2024  End Date: --       Order Dose: 1 tablet       Quantity: 10 tablet    Refills: 0       No discharge procedures on file.    PDMP Review         Value Time User    PDMP Reviewed  Yes 9/11/2023 12:19 PM BEAR Berumen            ED Provider  Electronically Signed by             Brittani Pitts MD  09/01/24 0751

## 2025-06-09 ENCOUNTER — TELEPHONE (OUTPATIENT)
Dept: GASTROENTEROLOGY | Facility: CLINIC | Age: 80
End: 2025-06-09

## 2025-06-09 NOTE — TELEPHONE ENCOUNTER
Pt needs to be scheduled for an Office Visit to discuss a colonoscopy for hx of colonic polyps with Dr Reeves. Called and spoke to pt whom informed she wants to think about it and will call us back if wishes to schedule. Recall letter mailed as well.

## 2025-08-18 ENCOUNTER — HOSPITAL ENCOUNTER (OUTPATIENT)
Dept: VASCULAR ULTRASOUND | Facility: HOSPITAL | Age: 80
Discharge: HOME | End: 2025-08-18
Payer: COMMERCIAL

## 2025-08-18 ENCOUNTER — HOSPITAL ENCOUNTER (EMERGENCY)
Facility: HOSPITAL | Age: 80
Discharge: HOME | End: 2025-08-18
Payer: COMMERCIAL

## 2025-08-18 VITALS
TEMPERATURE: 97.8 F | RESPIRATION RATE: 18 BRPM | DIASTOLIC BLOOD PRESSURE: 62 MMHG | WEIGHT: 133.38 LBS | BODY MASS INDEX: 22.2 KG/M2 | OXYGEN SATURATION: 99 % | SYSTOLIC BLOOD PRESSURE: 131 MMHG | HEART RATE: 94 BPM

## 2025-08-18 DIAGNOSIS — I82.409 DVT (DEEP VENOUS THROMBOSIS) (HCC): Primary | ICD-10-CM

## 2025-08-18 DIAGNOSIS — R60.0 LOCALIZED EDEMA: ICD-10-CM

## 2025-08-18 DIAGNOSIS — M79.89 SWELLING OF RIGHT UPPER EXTREMITY: ICD-10-CM

## 2025-08-18 LAB
ANION GAP SERPL CALCULATED.3IONS-SCNC: 6 MMOL/L (ref 4–13)
ANISOCYTOSIS BLD QL SMEAR: PRESENT
APTT PPP: 25 SECONDS (ref 23–34)
BASOPHILS # BLD MANUAL: 0 THOUSAND/UL (ref 0–0.1)
BASOPHILS NFR MAR MANUAL: 0 % (ref 0–1)
BUN SERPL-MCNC: 12 MG/DL (ref 5–25)
CALCIUM SERPL-MCNC: 9 MG/DL (ref 8.4–10.2)
CHLORIDE SERPL-SCNC: 105 MMOL/L (ref 96–108)
CO2 SERPL-SCNC: 24 MMOL/L (ref 21–32)
CREAT SERPL-MCNC: 0.51 MG/DL (ref 0.6–1.3)
EOSINOPHIL # BLD MANUAL: 0.03 THOUSAND/UL (ref 0–0.4)
EOSINOPHIL NFR BLD MANUAL: 1 % (ref 0–6)
ERYTHROCYTE [DISTWIDTH] IN BLOOD BY AUTOMATED COUNT: 19.6 % (ref 11.6–15.1)
GFR SERPL CREATININE-BSD FRML MDRD: 92 ML/MIN/1.73SQ M
GLUCOSE SERPL-MCNC: 92 MG/DL (ref 65–140)
HCT VFR BLD AUTO: 27.8 % (ref 34.8–46.1)
HGB BLD-MCNC: 9.2 G/DL (ref 11.5–15.4)
INR PPP: 0.92 (ref 0.85–1.19)
LYMPHOCYTES # BLD AUTO: 0.52 THOUSAND/UL (ref 0.6–4.47)
LYMPHOCYTES # BLD AUTO: 19 % (ref 14–44)
MCH RBC QN AUTO: 32.5 PG (ref 26.8–34.3)
MCHC RBC AUTO-ENTMCNC: 33.1 G/DL (ref 31.4–37.4)
MCV RBC AUTO: 98 FL (ref 82–98)
MONOCYTES # BLD AUTO: 0.11 THOUSAND/UL (ref 0–1.22)
MONOCYTES NFR BLD: 4 % (ref 4–12)
MYELOCYTE ABSOLUTE CT: 0.08 THOUSAND/UL (ref 0–0.1)
MYELOCYTES NFR BLD MANUAL: 3 % (ref 0–1)
NEUTROPHILS # BLD MANUAL: 2 THOUSAND/UL (ref 1.85–7.62)
NEUTS BAND NFR BLD MANUAL: 1 % (ref 0–8)
NEUTS SEG NFR BLD AUTO: 72 % (ref 43–75)
NRBC BLD AUTO-RTO: 1 /100 WBC (ref 0–2)
OVALOCYTES BLD QL SMEAR: PRESENT
PLATELET # BLD AUTO: 224 THOUSANDS/UL (ref 149–390)
PLATELET BLD QL SMEAR: ADEQUATE
PMV BLD AUTO: 9.2 FL (ref 8.9–12.7)
POIKILOCYTOSIS BLD QL SMEAR: PRESENT
POLYCHROMASIA BLD QL SMEAR: PRESENT
POTASSIUM SERPL-SCNC: 4.4 MMOL/L (ref 3.5–5.3)
PROTHROMBIN TIME: 13 SECONDS (ref 12.3–15)
RBC # BLD AUTO: 2.83 MILLION/UL (ref 3.81–5.12)
RBC MORPH BLD: PRESENT
SODIUM SERPL-SCNC: 135 MMOL/L (ref 135–147)
WBC # BLD AUTO: 2.74 THOUSAND/UL (ref 4.31–10.16)

## 2025-08-18 PROCEDURE — 85610 PROTHROMBIN TIME: CPT

## 2025-08-18 PROCEDURE — 93971 EXTREMITY STUDY: CPT

## 2025-08-18 PROCEDURE — 93971 EXTREMITY STUDY: CPT | Performed by: SURGERY

## 2025-08-18 PROCEDURE — 85027 COMPLETE CBC AUTOMATED: CPT

## 2025-08-18 PROCEDURE — 80048 BASIC METABOLIC PNL TOTAL CA: CPT

## 2025-08-18 PROCEDURE — 36415 COLL VENOUS BLD VENIPUNCTURE: CPT

## 2025-08-18 PROCEDURE — 99283 EMERGENCY DEPT VISIT LOW MDM: CPT

## 2025-08-18 PROCEDURE — 85007 BL SMEAR W/DIFF WBC COUNT: CPT

## 2025-08-18 PROCEDURE — 85730 THROMBOPLASTIN TIME PARTIAL: CPT

## 2025-08-18 PROCEDURE — 99285 EMERGENCY DEPT VISIT HI MDM: CPT

## 2025-08-18 RX ADMIN — APIXABAN 10 MG: 5 TABLET, FILM COATED ORAL at 11:30
